# Patient Record
Sex: MALE | Race: WHITE | NOT HISPANIC OR LATINO | URBAN - METROPOLITAN AREA
[De-identification: names, ages, dates, MRNs, and addresses within clinical notes are randomized per-mention and may not be internally consistent; named-entity substitution may affect disease eponyms.]

---

## 2017-11-07 ENCOUNTER — OUTPATIENT (OUTPATIENT)
Dept: OUTPATIENT SERVICES | Facility: HOSPITAL | Age: 47
LOS: 1 days | Discharge: HOME | End: 2017-11-07

## 2017-11-14 DIAGNOSIS — K57.30 DIVERTICULOSIS OF LARGE INTESTINE WITHOUT PERFORATION OR ABSCESS WITHOUT BLEEDING: ICD-10-CM

## 2017-11-14 DIAGNOSIS — K57.32 DIVERTICULITIS OF LARGE INTESTINE WITHOUT PERFORATION OR ABSCESS WITHOUT BLEEDING: ICD-10-CM

## 2019-02-09 ENCOUNTER — TRANSCRIPTION ENCOUNTER (OUTPATIENT)
Age: 49
End: 2019-02-09

## 2020-01-28 ENCOUNTER — OUTPATIENT (OUTPATIENT)
Dept: OUTPATIENT SERVICES | Facility: HOSPITAL | Age: 50
LOS: 1 days | Discharge: HOME | End: 2020-01-28

## 2020-01-29 DIAGNOSIS — G47.33 OBSTRUCTIVE SLEEP APNEA (ADULT) (PEDIATRIC): ICD-10-CM

## 2020-03-06 PROBLEM — Z00.00 ENCOUNTER FOR PREVENTIVE HEALTH EXAMINATION: Status: ACTIVE | Noted: 2020-03-06

## 2020-09-12 ENCOUNTER — TRANSCRIPTION ENCOUNTER (OUTPATIENT)
Age: 50
End: 2020-09-12

## 2020-10-01 ENCOUNTER — APPOINTMENT (OUTPATIENT)
Dept: GASTROENTEROLOGY | Facility: CLINIC | Age: 50
End: 2020-10-01
Payer: COMMERCIAL

## 2020-10-01 DIAGNOSIS — Z80.1 FAMILY HISTORY OF MALIGNANT NEOPLASM OF TRACHEA, BRONCHUS AND LUNG: ICD-10-CM

## 2020-10-01 DIAGNOSIS — Z80.0 FAMILY HISTORY OF MALIGNANT NEOPLASM OF DIGESTIVE ORGANS: ICD-10-CM

## 2020-10-01 DIAGNOSIS — Z78.9 OTHER SPECIFIED HEALTH STATUS: ICD-10-CM

## 2020-10-01 PROCEDURE — 99214 OFFICE O/P EST MOD 30 MIN: CPT | Mod: 95

## 2020-10-01 NOTE — PHYSICAL EXAM
[General Appearance - Alert] : alert [Hearing Threshold Finger Rub Not [place]] : hearing was normal [] : no respiratory distress [Skin Color & Pigmentation] : normal skin color and pigmentation [Oriented To Time, Place, And Person] : oriented to person, place, and time

## 2020-10-01 NOTE — ASSESSMENT
[FreeTextEntry1] : 50 year old male patient average risk for CRC, hx of recurrent diverticulitis, NAFLD/ OROSCO, presents with new episode of diverticulitis, treated at [facility] with cipro and flagyl 10 days, currently resolved. Last colonoscopy 2017 with diverticulosis. \par Otherwise no GI complaints. \par \par Rec: cipro/flagyl \par colonoscopy in 2022

## 2020-10-01 NOTE — HISTORY OF PRESENT ILLNESS
[Home] : at home, [unfilled] , at the time of the visit. [Medical Office: ([facility])___] : at the medical office located in  [Verbal consent obtained from patient] : the patient, [unfilled] [FreeTextEntry4] : Analisa Gar [de-identified] : 50 year old male patient average risk for CRC, hx of recurrent diverticulitis, NAFLD/ OROSCO, presents with new episode of diverticulitis, treated at [facility] with cipro and flagyl 10 days, currently resolved. Last colonoscopy 2017 with diverticulosis. \par Otherwise no GI complaints. \par

## 2021-04-22 ENCOUNTER — TRANSCRIPTION ENCOUNTER (OUTPATIENT)
Age: 51
End: 2021-04-22

## 2021-04-22 ENCOUNTER — APPOINTMENT (OUTPATIENT)
Age: 51
End: 2021-04-22

## 2021-06-10 ENCOUNTER — APPOINTMENT (OUTPATIENT)
Age: 51
End: 2021-06-10
Payer: COMMERCIAL

## 2021-06-10 VITALS
RESPIRATION RATE: 12 BRPM | OXYGEN SATURATION: 97 % | DIASTOLIC BLOOD PRESSURE: 80 MMHG | HEART RATE: 82 BPM | WEIGHT: 223 LBS | SYSTOLIC BLOOD PRESSURE: 120 MMHG

## 2021-06-10 DIAGNOSIS — Z99.89 OBSTRUCTIVE SLEEP APNEA (ADULT) (PEDIATRIC): ICD-10-CM

## 2021-06-10 DIAGNOSIS — G47.33 OBSTRUCTIVE SLEEP APNEA (ADULT) (PEDIATRIC): ICD-10-CM

## 2021-06-10 PROCEDURE — 99212 OFFICE O/P EST SF 10 MIN: CPT

## 2021-06-13 PROBLEM — G47.33 OSA ON CPAP: Status: ACTIVE | Noted: 2021-06-13

## 2021-06-16 ENCOUNTER — INPATIENT (INPATIENT)
Facility: HOSPITAL | Age: 51
LOS: 2 days | Discharge: HOME HEALTH | End: 2021-06-19
Attending: SURGERY | Admitting: SURGERY
Payer: COMMERCIAL

## 2021-06-16 VITALS
HEIGHT: 69 IN | HEART RATE: 105 BPM | TEMPERATURE: 101 F | DIASTOLIC BLOOD PRESSURE: 79 MMHG | SYSTOLIC BLOOD PRESSURE: 126 MMHG | WEIGHT: 218.04 LBS | RESPIRATION RATE: 18 BRPM | OXYGEN SATURATION: 96 %

## 2021-06-16 LAB
ALBUMIN SERPL ELPH-MCNC: 4.1 G/DL — SIGNIFICANT CHANGE UP (ref 3.5–5.2)
ALP SERPL-CCNC: 53 U/L — SIGNIFICANT CHANGE UP (ref 30–115)
ALT FLD-CCNC: 16 U/L — SIGNIFICANT CHANGE UP (ref 0–41)
ANION GAP SERPL CALC-SCNC: 12 MMOL/L — SIGNIFICANT CHANGE UP (ref 7–14)
APPEARANCE UR: CLEAR — SIGNIFICANT CHANGE UP
AST SERPL-CCNC: 22 U/L — SIGNIFICANT CHANGE UP (ref 0–41)
BASOPHILS # BLD AUTO: 0.05 K/UL — SIGNIFICANT CHANGE UP (ref 0–0.2)
BASOPHILS NFR BLD AUTO: 0.3 % — SIGNIFICANT CHANGE UP (ref 0–1)
BILIRUB DIRECT SERPL-MCNC: 0.2 MG/DL — SIGNIFICANT CHANGE UP (ref 0–0.2)
BILIRUB INDIRECT FLD-MCNC: 0.6 MG/DL — SIGNIFICANT CHANGE UP (ref 0.2–1.2)
BILIRUB SERPL-MCNC: 0.8 MG/DL — SIGNIFICANT CHANGE UP (ref 0.2–1.2)
BILIRUB UR-MCNC: NEGATIVE — SIGNIFICANT CHANGE UP
BUN SERPL-MCNC: 13 MG/DL — SIGNIFICANT CHANGE UP (ref 10–20)
CALCIUM SERPL-MCNC: 9.4 MG/DL — SIGNIFICANT CHANGE UP (ref 8.5–10.1)
CHLORIDE SERPL-SCNC: 99 MMOL/L — SIGNIFICANT CHANGE UP (ref 98–110)
CO2 SERPL-SCNC: 21 MMOL/L — SIGNIFICANT CHANGE UP (ref 17–32)
COLOR SPEC: SIGNIFICANT CHANGE UP
CREAT SERPL-MCNC: 1.2 MG/DL — SIGNIFICANT CHANGE UP (ref 0.7–1.5)
DIFF PNL FLD: NEGATIVE — SIGNIFICANT CHANGE UP
EOSINOPHIL # BLD AUTO: 0.02 K/UL — SIGNIFICANT CHANGE UP (ref 0–0.7)
EOSINOPHIL NFR BLD AUTO: 0.1 % — SIGNIFICANT CHANGE UP (ref 0–8)
GLUCOSE SERPL-MCNC: 124 MG/DL — HIGH (ref 70–99)
GLUCOSE UR QL: NEGATIVE — SIGNIFICANT CHANGE UP
HCT VFR BLD CALC: 42.8 % — SIGNIFICANT CHANGE UP (ref 42–52)
HGB BLD-MCNC: 14.9 G/DL — SIGNIFICANT CHANGE UP (ref 14–18)
IMM GRANULOCYTES NFR BLD AUTO: 0.5 % — HIGH (ref 0.1–0.3)
KETONES UR-MCNC: NEGATIVE — SIGNIFICANT CHANGE UP
LACTATE SERPL-SCNC: 1.1 MMOL/L — SIGNIFICANT CHANGE UP (ref 0.7–2)
LEUKOCYTE ESTERASE UR-ACNC: NEGATIVE — SIGNIFICANT CHANGE UP
LIDOCAIN IGE QN: 16 U/L — SIGNIFICANT CHANGE UP (ref 7–60)
LYMPHOCYTES # BLD AUTO: 0.57 K/UL — LOW (ref 1.2–3.4)
LYMPHOCYTES # BLD AUTO: 3.4 % — LOW (ref 20.5–51.1)
MCHC RBC-ENTMCNC: 29.7 PG — SIGNIFICANT CHANGE UP (ref 27–31)
MCHC RBC-ENTMCNC: 34.8 G/DL — SIGNIFICANT CHANGE UP (ref 32–37)
MCV RBC AUTO: 85.4 FL — SIGNIFICANT CHANGE UP (ref 80–94)
MONOCYTES # BLD AUTO: 1.13 K/UL — HIGH (ref 0.1–0.6)
MONOCYTES NFR BLD AUTO: 6.7 % — SIGNIFICANT CHANGE UP (ref 1.7–9.3)
NEUTROPHILS # BLD AUTO: 14.95 K/UL — HIGH (ref 1.4–6.5)
NEUTROPHILS NFR BLD AUTO: 89 % — HIGH (ref 42.2–75.2)
NITRITE UR-MCNC: NEGATIVE — SIGNIFICANT CHANGE UP
NRBC # BLD: 0 /100 WBCS — SIGNIFICANT CHANGE UP (ref 0–0)
PH UR: 7 — SIGNIFICANT CHANGE UP (ref 5–8)
PLATELET # BLD AUTO: 273 K/UL — SIGNIFICANT CHANGE UP (ref 130–400)
POTASSIUM SERPL-MCNC: 4.3 MMOL/L — SIGNIFICANT CHANGE UP (ref 3.5–5)
POTASSIUM SERPL-SCNC: 4.3 MMOL/L — SIGNIFICANT CHANGE UP (ref 3.5–5)
PROT SERPL-MCNC: 6.7 G/DL — SIGNIFICANT CHANGE UP (ref 6–8)
PROT UR-MCNC: NEGATIVE — SIGNIFICANT CHANGE UP
RBC # BLD: 5.01 M/UL — SIGNIFICANT CHANGE UP (ref 4.7–6.1)
RBC # FLD: 13.4 % — SIGNIFICANT CHANGE UP (ref 11.5–14.5)
SARS-COV-2 RNA SPEC QL NAA+PROBE: SIGNIFICANT CHANGE UP
SODIUM SERPL-SCNC: 132 MMOL/L — LOW (ref 135–146)
SP GR SPEC: 1 — LOW (ref 1.01–1.03)
UROBILINOGEN FLD QL: SIGNIFICANT CHANGE UP
WBC # BLD: 16.8 K/UL — HIGH (ref 4.8–10.8)
WBC # FLD AUTO: 16.8 K/UL — HIGH (ref 4.8–10.8)

## 2021-06-16 PROCEDURE — 99285 EMERGENCY DEPT VISIT HI MDM: CPT

## 2021-06-16 PROCEDURE — 99283 EMERGENCY DEPT VISIT LOW MDM: CPT

## 2021-06-16 PROCEDURE — 74177 CT ABD & PELVIS W/CONTRAST: CPT | Mod: 26,MA

## 2021-06-16 RX ORDER — SODIUM CHLORIDE 9 MG/ML
1000 INJECTION, SOLUTION INTRAVENOUS
Refills: 0 | Status: DISCONTINUED | OUTPATIENT
Start: 2021-06-16 | End: 2021-06-17

## 2021-06-16 RX ORDER — ONDANSETRON 8 MG/1
4 TABLET, FILM COATED ORAL ONCE
Refills: 0 | Status: COMPLETED | OUTPATIENT
Start: 2021-06-16 | End: 2021-06-16

## 2021-06-16 RX ORDER — MORPHINE SULFATE 50 MG/1
2 CAPSULE, EXTENDED RELEASE ORAL EVERY 4 HOURS
Refills: 0 | Status: DISCONTINUED | OUTPATIENT
Start: 2021-06-16 | End: 2021-06-16

## 2021-06-16 RX ORDER — SODIUM CHLORIDE 9 MG/ML
1000 INJECTION, SOLUTION INTRAVENOUS ONCE
Refills: 0 | Status: COMPLETED | OUTPATIENT
Start: 2021-06-16 | End: 2021-06-16

## 2021-06-16 RX ORDER — ENOXAPARIN SODIUM 100 MG/ML
40 INJECTION SUBCUTANEOUS DAILY
Refills: 0 | Status: DISCONTINUED | OUTPATIENT
Start: 2021-06-16 | End: 2021-06-17

## 2021-06-16 RX ORDER — ACETAMINOPHEN 500 MG
975 TABLET ORAL ONCE
Refills: 0 | Status: COMPLETED | OUTPATIENT
Start: 2021-06-16 | End: 2021-06-16

## 2021-06-16 RX ORDER — ONDANSETRON 8 MG/1
4 TABLET, FILM COATED ORAL EVERY 6 HOURS
Refills: 0 | Status: DISCONTINUED | OUTPATIENT
Start: 2021-06-16 | End: 2021-06-19

## 2021-06-16 RX ORDER — IBUPROFEN 200 MG
600 TABLET ORAL EVERY 6 HOURS
Refills: 0 | Status: DISCONTINUED | OUTPATIENT
Start: 2021-06-16 | End: 2021-06-18

## 2021-06-16 RX ORDER — PIPERACILLIN AND TAZOBACTAM 4; .5 G/20ML; G/20ML
3.38 INJECTION, POWDER, LYOPHILIZED, FOR SOLUTION INTRAVENOUS ONCE
Refills: 0 | Status: COMPLETED | OUTPATIENT
Start: 2021-06-16 | End: 2021-06-16

## 2021-06-16 RX ORDER — METRONIDAZOLE 500 MG
500 TABLET ORAL EVERY 8 HOURS
Refills: 0 | Status: DISCONTINUED | OUTPATIENT
Start: 2021-06-16 | End: 2021-06-19

## 2021-06-16 RX ORDER — CEFEPIME 1 G/1
2000 INJECTION, POWDER, FOR SOLUTION INTRAMUSCULAR; INTRAVENOUS EVERY 8 HOURS
Refills: 0 | Status: DISCONTINUED | OUTPATIENT
Start: 2021-06-16 | End: 2021-06-19

## 2021-06-16 RX ORDER — OXYCODONE HYDROCHLORIDE 5 MG/1
5 TABLET ORAL EVERY 6 HOURS
Refills: 0 | Status: DISCONTINUED | OUTPATIENT
Start: 2021-06-16 | End: 2021-06-19

## 2021-06-16 RX ORDER — ACETAMINOPHEN 500 MG
650 TABLET ORAL EVERY 6 HOURS
Refills: 0 | Status: DISCONTINUED | OUTPATIENT
Start: 2021-06-16 | End: 2021-06-19

## 2021-06-16 RX ADMIN — ONDANSETRON 4 MILLIGRAM(S): 8 TABLET, FILM COATED ORAL at 16:16

## 2021-06-16 RX ADMIN — SODIUM CHLORIDE 1000 MILLILITER(S): 9 INJECTION, SOLUTION INTRAVENOUS at 16:16

## 2021-06-16 RX ADMIN — SODIUM CHLORIDE 125 MILLILITER(S): 9 INJECTION, SOLUTION INTRAVENOUS at 22:27

## 2021-06-16 RX ADMIN — SODIUM CHLORIDE 1000 MILLILITER(S): 9 INJECTION, SOLUTION INTRAVENOUS at 22:07

## 2021-06-16 RX ADMIN — PIPERACILLIN AND TAZOBACTAM 3.38 GRAM(S): 4; .5 INJECTION, POWDER, LYOPHILIZED, FOR SOLUTION INTRAVENOUS at 22:07

## 2021-06-16 RX ADMIN — Medication 975 MILLIGRAM(S): at 22:07

## 2021-06-16 RX ADMIN — Medication 975 MILLIGRAM(S): at 20:10

## 2021-06-16 RX ADMIN — SODIUM CHLORIDE 1000 MILLILITER(S): 9 INJECTION, SOLUTION INTRAVENOUS at 20:10

## 2021-06-16 RX ADMIN — PIPERACILLIN AND TAZOBACTAM 200 GRAM(S): 4; .5 INJECTION, POWDER, LYOPHILIZED, FOR SOLUTION INTRAVENOUS at 20:25

## 2021-06-16 RX ADMIN — Medication 100 MILLIGRAM(S): at 23:00

## 2021-06-16 RX ADMIN — CEFEPIME 100 MILLIGRAM(S): 1 INJECTION, POWDER, FOR SOLUTION INTRAMUSCULAR; INTRAVENOUS at 22:26

## 2021-06-16 NOTE — ED PROVIDER NOTE - ATTENDING CONTRIBUTION TO CARE
49 y/o male with h/o multiple prior episodes of diverticulitis, in ER with c/o LLQ pain.  Pt states pain started ~ 11 days ago, c/w his prior episodes of diverticulitis, he called his GI doctor, Dr. Kim, and was started on cipro/flagyl which he's been taking 11 days.  Pt states the LLQ pain has improved, but today developed chills and worsening N and V.  + mucoid loose stool.  no dysuria/hematuria.  no cp/sob.  no ha/dizziness/loc.  no h/o abd surgeries in past.  PE - nad, nc/at, eomi, perrl, op - clear, cta b/l, no w/r/r, rrr, abd-soft, + LLQ tenderness, no guarding/rebound, nabs, from x 4, A&O x 3, no focal neuro deficits.  -ivf, check labs, ct abd.

## 2021-06-16 NOTE — H&P ADULT - NSHPLABSRESULTS_GEN_ALL_CORE
LAB/STUDIES:                        14.9   16.80 )-----------( 273      ( 2021 16:13 )             42.8     06-16    132<L>  |  99  |  13  ----------------------------<  124<H>  4.3   |  21  |  1.2    Ca    9.4      2021 16:13    TPro  6.7  /  Alb  4.1  /  TBili  0.8  /  DBili  0.2  /  AST  22  /  ALT  16  /  AlkPhos  53  06-16      LIVER FUNCTIONS - ( 2021 16:13 )  Alb: 4.1 g/dL / Pro: 6.7 g/dL / ALK PHOS: 53 U/L / ALT: 16 U/L / AST: 22 U/L / GGT: x           Urinalysis Basic - ( 2021 18:02 )    Color: Light Yellow / Appearance: Clear / S.005 / pH: x  Gluc: x / Ketone: Negative  / Bili: Negative / Urobili: <2 mg/dL   Blood: x / Protein: Negative / Nitrite: Negative   Leuk Esterase: Negative / RBC: x / WBC x   Sq Epi: x / Non Sq Epi: x / Bacteria: x      IMAGING:        ACCESS DEVICES:  [x] Peripheral IV  [ ] Central Venous Line	[ ] R	[ ] L	[ ] IJ	[ ] Fem	[ ] SC	Placed:   [ ] Arterial Line		[ ] R	[ ] L	[ ] Fem	[ ] Rad	[ ] Ax	Placed:   [ ] PICC:					[ ] Mediport  [ ] Urinary Catheter, Date Placed: qtc 464

## 2021-06-16 NOTE — H&P ADULT - ASSESSMENT
ASSESSMENT:  50M PMH/PSH only pertinent for diverticulitis, countless flares, never seen a surgeon, follows w/ Dr. Kim from GI, and PCP Otto, currently on day 10/14 outpt cipro/flagyl PO course for ongoing current flare. Last c-scope 2017, showed severe sigmoid diverticulosis, suboptimal prep, but no other gross abnormalities. Pt presents to ED today w/ 1 day of worsening of ongoing sx. Pain sharp, located in the LLQ and suprapubic area. No exacerbating/relieving fx. Pt denies CP, SOB, n/v/, urinary symptoms. Physical exam findings, imaging, and labs as documented above.     PLAN:  -NPO, IVF, Abx escalate to cefepime/flagyl  -Admit to surg  -Will likely need outpt colectomy due to ongoing flares, sx    Lines/Tubes: PIV    Above plan discussed with Attending Surgeon Dr. Espino, patient, patient family, and Primary team  06-16-21 @ 20:58

## 2021-06-16 NOTE — H&P ADULT - NSHPPHYSICALEXAM_GEN_ALL_CORE
VITALS:  T(F): 100.9 (06-16-21 @ 15:20), Max: 100.9 (06-16-21 @ 15:20)  HR: 105 (06-16-21 @ 15:20) (105 - 105)  BP: 126/79 (06-16-21 @ 15:20) (126/79 - 126/79)  RR: 18 (06-16-21 @ 15:20) (18 - 18)  SpO2: 96% (06-16-21 @ 15:20) (96% - 96%)    PHYSICAL EXAM:  General: NAD, AAOx3, calm and cooperative  HEENT: NCAT, ISIDRO, EOMI, Trachea ML, Neck supple  Cardiac: RRR S1, S2, no Murmurs, rubs or gallops  Respiratory: CTAB, normal respiratory effort, breath sounds equal BL, no wheeze, rhonchi or crackles  Abdomen: Soft, non-distended, non-tender, no rebound, no guarding. +BS.  Rectal: Good tone, +stool, no blood, no theodore-anal masses/lesions, no fistulas, fissures, hemorrhoids  Musculoskeletal: Strength 5/5 BL UE/LE, ROM intact, compartments soft  Neuro: Sensation grossly intact and equal throughout, no focal deficits  Vascular: Pulses 2+ throughout, extremities well perfused  Skin: Warm/dry, normal color, no jaundice  Incision/wound: healing well, dressings in place, clean, dry and intact

## 2021-06-16 NOTE — ED ADULT NURSE REASSESSMENT NOTE - NS ED NURSE REASSESS COMMENT FT1
Assumed care from previous  nurse c/o abdominal pain , nausea , vomiting , HX diverticulitis , pt AO x 4 , no vomiting noted this tome , no SOB ,denies nausea this time , denies abdominal pain this time , ambulatory , denies chest pain , denies headache , denies dizziness , , nursing rounds done

## 2021-06-16 NOTE — ED PROVIDER NOTE - NS ED ROS FT
Constitutional: no fever, chills, no recent weight loss, change in appetite or malaise  Eyes: no redness/discharge/pain/vision changes  ENT: no rhinorrhea/ear pain/sore throat  Cardiac: No chest pain, SOB or edema.  Respiratory: No cough or respiratory distress  : No dysuria, frequency, urgency or hematuria  MS: no pain to back or extremities, no loss of ROM, no weakness  Neuro: No headache or weakness. No LOC.  Skin: No skin rash.  Endocrine: No history of thyroid disease or diabetes.  Except as documented in the HPI, all other systems are negative.

## 2021-06-16 NOTE — H&P ADULT - HISTORY OF PRESENT ILLNESS
GENERAL SURGERY CONSULT NOTE    Patient: LUIS EDUARDO LUIS , 50y ([birth year])Male   MRN: [id number]  Location: [facility] ED  Visit: 06-16-21 Emergency  Date: 06-16-21 @ 20:58    HPI:  50M PMH/PSH only pertinent for diverticulitis, countless flares, never seen a surgeon, follows w/ Dr. Kim from GI, and PCP Otto, currently on day 10/14 outpt cipro/flagyl PO course for ongoing current flare. Last c-scope 2017, showed severe sigmoid diverticulosis, suboptimal prep, but no other gross abnormalities. Pt presents to ED today w/ 1 day of worsening of ongoing sx. Pain sharp, located in the LLQ and suprapubic area. No exacerbating/relieving fx. Pt denies CP, SOB, n/v/, urinary symptoms.    PAST MEDICAL & SURGICAL HISTORY:      Home Medications:      MEDICATIONS  (STANDING):    MEDICATIONS  (PRN):

## 2021-06-16 NOTE — H&P ADULT - ATTENDING COMMENTS
49yo male with PMHx of diverticulitis presenting with acute onset of bilateral lower abdominal pain, nausea/vomiting, and subjective fever/chills. Last colonoscopy 2017 with Dr. Kim. Was prescribed Cipro/Flagyl by GI. Denies fever/chills, chest pain or shortness of breath. On exam, patient is tender in bilateral lower quadrants without peritonitis. Labs reviewed - WBC 16.8, LFT's WNL. Admit, pain control, NPO, IV fuilds, madhu control, antibiotics, serial abdominal exams, dispo pending.

## 2021-06-16 NOTE — ED PROVIDER NOTE - OBJECTIVE STATEMENT
pt with h/o diverticulitis presents to ED for eval of 10 days of abd pain, n/v/d c/w prior hx of diverticulitis but no relief from cipro/flagyl taken since onset of sxs. pain is sharp, nonradiating, moderate. denies exacerbating or relieving factors. Denies fever/chill/HA/dizziness/chest pain/palpitation/sob/black stool/bloody stool/urinary sxs

## 2021-06-16 NOTE — ED PROVIDER NOTE - PROGRESS NOTE DETAILS
d/w  of gen surg, will see pt in ED wbc 16K, lactate neg.  CT abd: + acute sigmoid diverticulitis with 2.8 cm intramural abscess, no pneumoperitoneum.  Pt given IV abx, case d/w surgery, to come and eval. Pt seen and eval by surgery, to be admitted to surgical service for continued care.

## 2021-06-17 LAB
ANION GAP SERPL CALC-SCNC: 10 MMOL/L — SIGNIFICANT CHANGE UP (ref 7–14)
ANION GAP SERPL CALC-SCNC: 10 MMOL/L — SIGNIFICANT CHANGE UP (ref 7–14)
BASOPHILS # BLD AUTO: 0.03 K/UL — SIGNIFICANT CHANGE UP (ref 0–0.2)
BASOPHILS # BLD AUTO: 0.06 K/UL — SIGNIFICANT CHANGE UP (ref 0–0.2)
BASOPHILS NFR BLD AUTO: 0.5 % — SIGNIFICANT CHANGE UP (ref 0–1)
BASOPHILS NFR BLD AUTO: 0.8 % — SIGNIFICANT CHANGE UP (ref 0–1)
BUN SERPL-MCNC: 12 MG/DL — SIGNIFICANT CHANGE UP (ref 10–20)
BUN SERPL-MCNC: 9 MG/DL — LOW (ref 10–20)
CALCIUM SERPL-MCNC: 8.3 MG/DL — LOW (ref 8.5–10.1)
CALCIUM SERPL-MCNC: 9 MG/DL — SIGNIFICANT CHANGE UP (ref 8.5–10.1)
CHLORIDE SERPL-SCNC: 101 MMOL/L — SIGNIFICANT CHANGE UP (ref 98–110)
CHLORIDE SERPL-SCNC: 103 MMOL/L — SIGNIFICANT CHANGE UP (ref 98–110)
CO2 SERPL-SCNC: 25 MMOL/L — SIGNIFICANT CHANGE UP (ref 17–32)
CO2 SERPL-SCNC: 26 MMOL/L — SIGNIFICANT CHANGE UP (ref 17–32)
COVID-19 SPIKE DOMAIN AB INTERP: POSITIVE
COVID-19 SPIKE DOMAIN ANTIBODY RESULT: >250 U/ML — HIGH
CREAT SERPL-MCNC: 1.2 MG/DL — SIGNIFICANT CHANGE UP (ref 0.7–1.5)
CREAT SERPL-MCNC: 1.3 MG/DL — SIGNIFICANT CHANGE UP (ref 0.7–1.5)
CULTURE RESULTS: NO GROWTH — SIGNIFICANT CHANGE UP
EOSINOPHIL # BLD AUTO: 0.05 K/UL — SIGNIFICANT CHANGE UP (ref 0–0.7)
EOSINOPHIL # BLD AUTO: 0.08 K/UL — SIGNIFICANT CHANGE UP (ref 0–0.7)
EOSINOPHIL NFR BLD AUTO: 0.7 % — SIGNIFICANT CHANGE UP (ref 0–8)
EOSINOPHIL NFR BLD AUTO: 1.3 % — SIGNIFICANT CHANGE UP (ref 0–8)
GLUCOSE SERPL-MCNC: 172 MG/DL — HIGH (ref 70–99)
GLUCOSE SERPL-MCNC: 99 MG/DL — SIGNIFICANT CHANGE UP (ref 70–99)
HCT VFR BLD CALC: 40.2 % — LOW (ref 42–52)
HCT VFR BLD CALC: 40.9 % — LOW (ref 42–52)
HGB BLD-MCNC: 13.4 G/DL — LOW (ref 14–18)
HGB BLD-MCNC: 13.6 G/DL — LOW (ref 14–18)
IMM GRANULOCYTES NFR BLD AUTO: 0.2 % — SIGNIFICANT CHANGE UP (ref 0.1–0.3)
IMM GRANULOCYTES NFR BLD AUTO: 0.5 % — HIGH (ref 0.1–0.3)
LYMPHOCYTES # BLD AUTO: 0.62 K/UL — LOW (ref 1.2–3.4)
LYMPHOCYTES # BLD AUTO: 0.77 K/UL — LOW (ref 1.2–3.4)
LYMPHOCYTES # BLD AUTO: 12.6 % — LOW (ref 20.5–51.1)
LYMPHOCYTES # BLD AUTO: 8.2 % — LOW (ref 20.5–51.1)
MAGNESIUM SERPL-MCNC: 1.8 MG/DL — SIGNIFICANT CHANGE UP (ref 1.8–2.4)
MAGNESIUM SERPL-MCNC: 1.8 MG/DL — SIGNIFICANT CHANGE UP (ref 1.8–2.4)
MCHC RBC-ENTMCNC: 29.5 PG — SIGNIFICANT CHANGE UP (ref 27–31)
MCHC RBC-ENTMCNC: 29.6 PG — SIGNIFICANT CHANGE UP (ref 27–31)
MCHC RBC-ENTMCNC: 33.3 G/DL — SIGNIFICANT CHANGE UP (ref 32–37)
MCHC RBC-ENTMCNC: 33.3 G/DL — SIGNIFICANT CHANGE UP (ref 32–37)
MCV RBC AUTO: 88.5 FL — SIGNIFICANT CHANGE UP (ref 80–94)
MCV RBC AUTO: 88.9 FL — SIGNIFICANT CHANGE UP (ref 80–94)
MONOCYTES # BLD AUTO: 0.35 K/UL — SIGNIFICANT CHANGE UP (ref 0.1–0.6)
MONOCYTES # BLD AUTO: 0.57 K/UL — SIGNIFICANT CHANGE UP (ref 0.1–0.6)
MONOCYTES NFR BLD AUTO: 5.7 % — SIGNIFICANT CHANGE UP (ref 1.7–9.3)
MONOCYTES NFR BLD AUTO: 7.6 % — SIGNIFICANT CHANGE UP (ref 1.7–9.3)
NEUTROPHILS # BLD AUTO: 4.88 K/UL — SIGNIFICANT CHANGE UP (ref 1.4–6.5)
NEUTROPHILS # BLD AUTO: 6.18 K/UL — SIGNIFICANT CHANGE UP (ref 1.4–6.5)
NEUTROPHILS NFR BLD AUTO: 79.7 % — HIGH (ref 42.2–75.2)
NEUTROPHILS NFR BLD AUTO: 82.2 % — HIGH (ref 42.2–75.2)
NRBC # BLD: 0 /100 WBCS — SIGNIFICANT CHANGE UP (ref 0–0)
NRBC # BLD: 0 /100 WBCS — SIGNIFICANT CHANGE UP (ref 0–0)
PHOSPHATE SERPL-MCNC: 3 MG/DL — SIGNIFICANT CHANGE UP (ref 2.1–4.9)
PHOSPHATE SERPL-MCNC: 3.7 MG/DL — SIGNIFICANT CHANGE UP (ref 2.1–4.9)
PLATELET # BLD AUTO: 244 K/UL — SIGNIFICANT CHANGE UP (ref 130–400)
PLATELET # BLD AUTO: 244 K/UL — SIGNIFICANT CHANGE UP (ref 130–400)
POTASSIUM SERPL-MCNC: 3.9 MMOL/L — SIGNIFICANT CHANGE UP (ref 3.5–5)
POTASSIUM SERPL-MCNC: 4.4 MMOL/L — SIGNIFICANT CHANGE UP (ref 3.5–5)
POTASSIUM SERPL-SCNC: 3.9 MMOL/L — SIGNIFICANT CHANGE UP (ref 3.5–5)
POTASSIUM SERPL-SCNC: 4.4 MMOL/L — SIGNIFICANT CHANGE UP (ref 3.5–5)
RBC # BLD: 4.54 M/UL — LOW (ref 4.7–6.1)
RBC # BLD: 4.6 M/UL — LOW (ref 4.7–6.1)
RBC # FLD: 13.6 % — SIGNIFICANT CHANGE UP (ref 11.5–14.5)
RBC # FLD: 13.9 % — SIGNIFICANT CHANGE UP (ref 11.5–14.5)
SARS-COV-2 IGG+IGM SERPL QL IA: >250 U/ML — HIGH
SARS-COV-2 IGG+IGM SERPL QL IA: POSITIVE
SODIUM SERPL-SCNC: 136 MMOL/L — SIGNIFICANT CHANGE UP (ref 135–146)
SODIUM SERPL-SCNC: 139 MMOL/L — SIGNIFICANT CHANGE UP (ref 135–146)
SPECIMEN SOURCE: SIGNIFICANT CHANGE UP
WBC # BLD: 6.12 K/UL — SIGNIFICANT CHANGE UP (ref 4.8–10.8)
WBC # BLD: 7.52 K/UL — SIGNIFICANT CHANGE UP (ref 4.8–10.8)
WBC # FLD AUTO: 6.12 K/UL — SIGNIFICANT CHANGE UP (ref 4.8–10.8)
WBC # FLD AUTO: 7.52 K/UL — SIGNIFICANT CHANGE UP (ref 4.8–10.8)

## 2021-06-17 PROCEDURE — 99232 SBSQ HOSP IP/OBS MODERATE 35: CPT

## 2021-06-17 RX ORDER — PANTOPRAZOLE SODIUM 20 MG/1
40 TABLET, DELAYED RELEASE ORAL
Refills: 0 | Status: DISCONTINUED | OUTPATIENT
Start: 2021-06-17 | End: 2021-06-19

## 2021-06-17 RX ORDER — SODIUM CHLORIDE 9 MG/ML
1000 INJECTION, SOLUTION INTRAVENOUS
Refills: 0 | Status: DISCONTINUED | OUTPATIENT
Start: 2021-06-17 | End: 2021-06-18

## 2021-06-17 RX ORDER — HEPARIN SODIUM 5000 [USP'U]/ML
5000 INJECTION INTRAVENOUS; SUBCUTANEOUS EVERY 8 HOURS
Refills: 0 | Status: DISCONTINUED | OUTPATIENT
Start: 2021-06-17 | End: 2021-06-19

## 2021-06-17 RX ADMIN — Medication 100 MILLIGRAM(S): at 06:40

## 2021-06-17 RX ADMIN — Medication 100 MILLIGRAM(S): at 13:24

## 2021-06-17 RX ADMIN — Medication 600 MILLIGRAM(S): at 05:06

## 2021-06-17 RX ADMIN — CEFEPIME 100 MILLIGRAM(S): 1 INJECTION, POWDER, FOR SOLUTION INTRAMUSCULAR; INTRAVENOUS at 21:11

## 2021-06-17 RX ADMIN — HEPARIN SODIUM 5000 UNIT(S): 5000 INJECTION INTRAVENOUS; SUBCUTANEOUS at 21:11

## 2021-06-17 RX ADMIN — Medication 650 MILLIGRAM(S): at 01:01

## 2021-06-17 RX ADMIN — Medication 650 MILLIGRAM(S): at 17:43

## 2021-06-17 RX ADMIN — ONDANSETRON 4 MILLIGRAM(S): 8 TABLET, FILM COATED ORAL at 14:52

## 2021-06-17 RX ADMIN — Medication 600 MILLIGRAM(S): at 17:43

## 2021-06-17 RX ADMIN — CEFEPIME 100 MILLIGRAM(S): 1 INJECTION, POWDER, FOR SOLUTION INTRAMUSCULAR; INTRAVENOUS at 05:04

## 2021-06-17 RX ADMIN — CEFEPIME 100 MILLIGRAM(S): 1 INJECTION, POWDER, FOR SOLUTION INTRAMUSCULAR; INTRAVENOUS at 13:24

## 2021-06-17 RX ADMIN — Medication 600 MILLIGRAM(S): at 23:31

## 2021-06-17 RX ADMIN — Medication 650 MILLIGRAM(S): at 00:30

## 2021-06-17 RX ADMIN — Medication 650 MILLIGRAM(S): at 23:31

## 2021-06-17 RX ADMIN — Medication 650 MILLIGRAM(S): at 05:04

## 2021-06-17 RX ADMIN — ENOXAPARIN SODIUM 40 MILLIGRAM(S): 100 INJECTION SUBCUTANEOUS at 11:45

## 2021-06-17 RX ADMIN — Medication 100 MILLIGRAM(S): at 21:11

## 2021-06-17 RX ADMIN — Medication 600 MILLIGRAM(S): at 00:30

## 2021-06-17 RX ADMIN — PANTOPRAZOLE SODIUM 40 MILLIGRAM(S): 20 TABLET, DELAYED RELEASE ORAL at 07:47

## 2021-06-17 RX ADMIN — Medication 600 MILLIGRAM(S): at 01:01

## 2021-06-17 RX ADMIN — SODIUM CHLORIDE 150 MILLILITER(S): 9 INJECTION, SOLUTION INTRAVENOUS at 13:20

## 2021-06-17 NOTE — PROGRESS NOTE ADULT - SUBJECTIVE AND OBJECTIVE BOX
GENERAL SURGERY PROGRESS NOTE    Patient: LUIS EDUARDO LUIS , 50y (70)Male   MRN: [id number]  Location: [address]  Visit: 21 Inpatient  Date: 21 @ 04:51    Hospital Day #: 2    Events of past 24 hours: No acute events    PAST MEDICAL & SURGICAL HISTORY:  History of diverticulitis        Vitals:   T(F): 99.9 (21 @ 01:17), Max: 100.9 (21 @ 15:20)  HR: 77 (21 @ 01:17)  BP: 141/81 (21 @ 01:17)  RR: 18 (21 @ 01:17)  SpO2: 99% (21 @ 23:30)      Diet, NPO:   Except Medications      Fluids: lactated ringers.: Solution, 1000 milliLiter(s) infuse at 125 mL/Hr      I & O's:  PHYSICAL EXAM:  General: NAD, AAOx3, calm and cooperative  HEENT: NCAT, ISIDRO, EOMI, Trachea ML, Neck supple  Cardiac: RRR S1, S2, no Murmurs, rubs or gallops  Respiratory: CTAB, normal respiratory effort, breath sounds equal BL, no wheeze, rhonchi or crackles  Abdomen: Soft, non-distended, non-tender, no rebound, no guarding. +BS.  Musculoskeletal: Strength 5/5 BL UE/LE, ROM intact, compartments soft  Incision/wound: healing well, dressings in place, clean, dry and intact    MEDICATIONS  (STANDING):  acetaminophen   Tablet .. 650 milliGRAM(s) Oral every 6 hours  cefepime   IVPB 2000 milliGRAM(s) IV Intermittent every 8 hours  enoxaparin Injectable 40 milliGRAM(s) SubCutaneous daily  ibuprofen  Tablet. 600 milliGRAM(s) Oral every 6 hours  lactated ringers. 1000 milliLiter(s) (125 mL/Hr) IV Continuous <Continuous>  metroNIDAZOLE  IVPB 500 milliGRAM(s) IV Intermittent every 8 hours  pantoprazole    Tablet 40 milliGRAM(s) Oral before breakfast    MEDICATIONS  (PRN):  ondansetron Injectable 4 milliGRAM(s) IV Push every 6 hours PRN Nausea  oxyCODONE    IR 5 milliGRAM(s) Oral every 6 hours PRN Moderate Pain (4 - 6)      DVT PROPHYLAXIS: enoxaparin Injectable 40 milliGRAM(s) SubCutaneous daily    GI PROPHYLAXIS: pantoprazole    Tablet 40 milliGRAM(s) Oral before breakfast    ANTICOAGULATION:   ANTIBIOTICS:  cefepime   IVPB 2000 milliGRAM(s)  metroNIDAZOLE  IVPB 500 milliGRAM(s)            LAB/STUDIES:  Labs:  CAPILLARY BLOOD GLUCOSE                              14.9   16.80 )-----------( 273      ( 2021 16:13 )             42.8       Auto Neutrophil %: 89.0 % (21 @ 16:13)  Auto Immature Granulocyte %: 0.5 % (21 @ 16:13)        132<L>  |  99  |  13  ----------------------------<  124<H>  4.3   |  21  |  1.2      Calcium, Total Serum: 9.4 mg/dL (21 @ 16:13)      LFTs:             6.7  | 0.8  | 22       ------------------[53      ( 2021 16:13 )  4.1  | 0.2  | 16          Lipase:16     Amylase:x         Lactate, Blood: 1.1 mmol/L (21 @ 16:13)      Coags:            Urinalysis Basic - ( 2021 18:02 )    Color: Light Yellow / Appearance: Clear / S.005 / pH: x  Gluc: x / Ketone: Negative  / Bili: Negative / Urobili: <2 mg/dL   Blood: x / Protein: Negative / Nitrite: Negative   Leuk Esterase: Negative / RBC: x / WBC x   Sq Epi: x / Non Sq Epi: x / Bacteria: x                IMAGING:

## 2021-06-17 NOTE — PROGRESS NOTE ADULT - ASSESSMENT
50M PMH/PSH only pertinent for diverticulitis, countless flares, never seen a surgeon, follows w/ Dr. Kim from GI, and PCP Otto, currently on day 10/14 outpt cipro/flagyl PO course for ongoing current flare. Last c-scope 2017, showed severe sigmoid diverticulosis, suboptimal prep, but no other gross abnormalities. Pt presents to ED today w/ 1 day of worsening of ongoing sx. Pain sharp, located in the LLQ and suprapubic area. No exacerbating/relieving fx. Pt denies CP, SOB, n/v/, urinary symptoms. Physical exam findings, imaging, and labs as documented above.     PLAN:  -NPO, IVF, Abx escalate to cefepime/flagyl  -Admit to surg  -Will likely need outpt colectomy due to ongoing flares, sx

## 2021-06-18 LAB
ANION GAP SERPL CALC-SCNC: 11 MMOL/L — SIGNIFICANT CHANGE UP (ref 7–14)
BASOPHILS # BLD AUTO: 0.05 K/UL — SIGNIFICANT CHANGE UP (ref 0–0.2)
BASOPHILS NFR BLD AUTO: 1 % — SIGNIFICANT CHANGE UP (ref 0–1)
BUN SERPL-MCNC: 9 MG/DL — LOW (ref 10–20)
CALCIUM SERPL-MCNC: 8.8 MG/DL — SIGNIFICANT CHANGE UP (ref 8.5–10.1)
CHLORIDE SERPL-SCNC: 102 MMOL/L — SIGNIFICANT CHANGE UP (ref 98–110)
CO2 SERPL-SCNC: 24 MMOL/L — SIGNIFICANT CHANGE UP (ref 17–32)
CREAT SERPL-MCNC: 1.2 MG/DL — SIGNIFICANT CHANGE UP (ref 0.7–1.5)
EOSINOPHIL # BLD AUTO: 0.19 K/UL — SIGNIFICANT CHANGE UP (ref 0–0.7)
EOSINOPHIL NFR BLD AUTO: 3.6 % — SIGNIFICANT CHANGE UP (ref 0–8)
GLUCOSE SERPL-MCNC: 146 MG/DL — HIGH (ref 70–99)
HCT VFR BLD CALC: 41.2 % — LOW (ref 42–52)
HGB BLD-MCNC: 13.8 G/DL — LOW (ref 14–18)
IMM GRANULOCYTES NFR BLD AUTO: 0.4 % — HIGH (ref 0.1–0.3)
LYMPHOCYTES # BLD AUTO: 1.05 K/UL — LOW (ref 1.2–3.4)
LYMPHOCYTES # BLD AUTO: 20.1 % — LOW (ref 20.5–51.1)
MAGNESIUM SERPL-MCNC: 2.2 MG/DL — SIGNIFICANT CHANGE UP (ref 1.8–2.4)
MCHC RBC-ENTMCNC: 29.5 PG — SIGNIFICANT CHANGE UP (ref 27–31)
MCHC RBC-ENTMCNC: 33.5 G/DL — SIGNIFICANT CHANGE UP (ref 32–37)
MCV RBC AUTO: 88 FL — SIGNIFICANT CHANGE UP (ref 80–94)
MONOCYTES # BLD AUTO: 0.53 K/UL — SIGNIFICANT CHANGE UP (ref 0.1–0.6)
MONOCYTES NFR BLD AUTO: 10.2 % — HIGH (ref 1.7–9.3)
NEUTROPHILS # BLD AUTO: 3.38 K/UL — SIGNIFICANT CHANGE UP (ref 1.4–6.5)
NEUTROPHILS NFR BLD AUTO: 64.7 % — SIGNIFICANT CHANGE UP (ref 42.2–75.2)
NRBC # BLD: 0 /100 WBCS — SIGNIFICANT CHANGE UP (ref 0–0)
PHOSPHATE SERPL-MCNC: 2.6 MG/DL — SIGNIFICANT CHANGE UP (ref 2.1–4.9)
PLATELET # BLD AUTO: 231 K/UL — SIGNIFICANT CHANGE UP (ref 130–400)
POTASSIUM SERPL-MCNC: 4 MMOL/L — SIGNIFICANT CHANGE UP (ref 3.5–5)
POTASSIUM SERPL-SCNC: 4 MMOL/L — SIGNIFICANT CHANGE UP (ref 3.5–5)
RBC # BLD: 4.68 M/UL — LOW (ref 4.7–6.1)
RBC # FLD: 13.9 % — SIGNIFICANT CHANGE UP (ref 11.5–14.5)
SODIUM SERPL-SCNC: 137 MMOL/L — SIGNIFICANT CHANGE UP (ref 135–146)
WBC # BLD: 5.22 K/UL — SIGNIFICANT CHANGE UP (ref 4.8–10.8)
WBC # FLD AUTO: 5.22 K/UL — SIGNIFICANT CHANGE UP (ref 4.8–10.8)

## 2021-06-18 RX ORDER — MAGNESIUM SULFATE 500 MG/ML
1 VIAL (ML) INJECTION ONCE
Refills: 0 | Status: COMPLETED | OUTPATIENT
Start: 2021-06-18 | End: 2021-06-18

## 2021-06-18 RX ADMIN — Medication 650 MILLIGRAM(S): at 23:32

## 2021-06-18 RX ADMIN — SODIUM CHLORIDE 150 MILLILITER(S): 9 INJECTION, SOLUTION INTRAVENOUS at 04:47

## 2021-06-18 RX ADMIN — PANTOPRAZOLE SODIUM 40 MILLIGRAM(S): 20 TABLET, DELAYED RELEASE ORAL at 06:20

## 2021-06-18 RX ADMIN — CEFEPIME 100 MILLIGRAM(S): 1 INJECTION, POWDER, FOR SOLUTION INTRAMUSCULAR; INTRAVENOUS at 21:20

## 2021-06-18 RX ADMIN — Medication 650 MILLIGRAM(S): at 11:01

## 2021-06-18 RX ADMIN — Medication 100 MILLIGRAM(S): at 13:49

## 2021-06-18 RX ADMIN — CEFEPIME 100 MILLIGRAM(S): 1 INJECTION, POWDER, FOR SOLUTION INTRAMUSCULAR; INTRAVENOUS at 13:49

## 2021-06-18 RX ADMIN — HEPARIN SODIUM 5000 UNIT(S): 5000 INJECTION INTRAVENOUS; SUBCUTANEOUS at 13:49

## 2021-06-18 RX ADMIN — HEPARIN SODIUM 5000 UNIT(S): 5000 INJECTION INTRAVENOUS; SUBCUTANEOUS at 05:07

## 2021-06-18 RX ADMIN — CEFEPIME 100 MILLIGRAM(S): 1 INJECTION, POWDER, FOR SOLUTION INTRAMUSCULAR; INTRAVENOUS at 05:07

## 2021-06-18 RX ADMIN — Medication 600 MILLIGRAM(S): at 05:06

## 2021-06-18 RX ADMIN — Medication 100 MILLIGRAM(S): at 21:20

## 2021-06-18 RX ADMIN — HEPARIN SODIUM 5000 UNIT(S): 5000 INJECTION INTRAVENOUS; SUBCUTANEOUS at 21:20

## 2021-06-18 RX ADMIN — Medication 100 MILLIGRAM(S): at 05:07

## 2021-06-18 RX ADMIN — Medication 100 GRAM(S): at 01:55

## 2021-06-18 RX ADMIN — Medication 650 MILLIGRAM(S): at 05:06

## 2021-06-18 NOTE — PROGRESS NOTE ADULT - SUBJECTIVE AND OBJECTIVE BOX
GENERAL SURGERY PROGRESS NOTE    Patient: LUIS EDUARDO LUIS , 50y (70)Male   MRN: [id number]  Location: [address]  Visit: 21 Inpatient  Date: 21 @ 10:25    Hospital Day #: 3  Post-Op Day #:    Procedure/Dx/Injuries:    Events of past 24 hours: Patient had temperature of 100.9 F last night. No other acute events. Tolerated CLD.     PAST MEDICAL & SURGICAL HISTORY:  History of diverticulitis      Vitals:   T(F): 97.8 (21 @ 09:35), Max: 100.9 (21 @ 17:00)  HR: 65 (21 @ 09:35)  BP: 99/60 (21 @ 09:35)  RR: 18 (21 @ 09:35)  SpO2: --      Diet, Low Fiber:   Supplement Feeding Modality:  Oral  Ensure Enlive Cans or Servings Per Day:  1       Frequency:  Three Times a day      Fluids:     I & O's:    21 @ 07:01  -  21 @ 07:00  --------------------------------------------------------  IN:    dextrose 5% + sodium chloride 0.45%: 825 mL    Lactated Ringers: 687 mL  Total IN: 1512 mL    OUT:    Voided (mL): 2925 mL  Total OUT: 2925 mL    Total NET: -1413 mL        Bowel Movement: : [] YES [x] NO  Flatus: : [x] YES [] NO    PHYSICAL EXAM:  General: NAD, AAOx3, calm and cooperative  Abdomen: Soft, non-distended, non-tender, no rebound, no guarding. +BS.      MEDICATIONS  (STANDING):  acetaminophen   Tablet .. 650 milliGRAM(s) Oral every 6 hours  cefepime   IVPB 2000 milliGRAM(s) IV Intermittent every 8 hours  heparin   Injectable 5000 Unit(s) SubCutaneous every 8 hours  metroNIDAZOLE  IVPB 500 milliGRAM(s) IV Intermittent every 8 hours  pantoprazole    Tablet 40 milliGRAM(s) Oral before breakfast    MEDICATIONS  (PRN):  ondansetron Injectable 4 milliGRAM(s) IV Push every 6 hours PRN Nausea  oxyCODONE    IR 5 milliGRAM(s) Oral every 6 hours PRN Moderate Pain (4 - 6)      DVT PROPHYLAXIS: heparin   Injectable 5000 Unit(s) SubCutaneous every 8 hours    GI PROPHYLAXIS: pantoprazole    Tablet 40 milliGRAM(s) Oral before breakfast    ANTICOAGULATION:   ANTIBIOTICS:  cefepime   IVPB 2000 milliGRAM(s)  metroNIDAZOLE  IVPB 500 milliGRAM(s)      LAB/STUDIES:                       13.4   6.12  )-----------( 244      ( 2021 20:34 )             40.2       Auto Neutrophil %: 79.7 % (21 @ 20:34)  Auto Immature Granulocyte %: 0.2 % (21 @ 20:34)        136  |  101  |  9<L>  ----------------------------<  172<H>  3.9   |  25  |  1.3      Calcium, Total Serum: 8.3 mg/dL (21 @ 20:34)      LFTs:             6.7  | 0.8  | 22       ------------------[53      ( 2021 16:13 )  4.1  | 0.2  | 16          Lipase:16     Amylase:x         Lactate, Blood: 1.1 mmol/L (21 @ 16:13)      Coags:      Urinalysis Basic - ( 2021 18:02 )    Color: Light Yellow / Appearance: Clear / S.005 / pH: x  Gluc: x / Ketone: Negative  / Bili: Negative / Urobili: <2 mg/dL   Blood: x / Protein: Negative / Nitrite: Negative   Leuk Esterase: Negative / RBC: x / WBC x   Sq Epi: x / Non Sq Epi: x / Bacteria: x        Culture - Urine (collected 2021 18:02)  Source: .Urine Clean Catch (Midstream)  Final Report (2021 20:41):    No growth      IMAGING:  N/A    ACCESS/ DEVICES:  [ x] Peripheral IV  [ ] Central Venous Line	[ ] R	[ ] L	[ ] IJ	[ ] Fem	[ ] SC	Placed:   [ ] Arterial Line		[ ] R	[ ] L	[ ] Fem	[ ] Rad	[ ] Ax	Placed:   [ ] PICC:					[ ] Mediport  [ ] Urinary Catheter,  Date Placed:   [ ] Chest tube: [ ] Right, [ ] Left  [ ] TAMMY/Jeff Drains

## 2021-06-18 NOTE — PROGRESS NOTE ADULT - ASSESSMENT
ASSESSMENT:  50y M w/ PMHx of recurrent diverticulitis presents for acute sigmoid diverticulitis with intramural abscess.     PLAN:  - advance to low fiber diet  - D/c motrin for slightly elevated Cr  - ID consult for discharge antibiotic recommendations as patient failed outpatient therapy  - discharge planning  - Monitor vitals  - Monitor labs and replete as necessary  - Monitor for bowel function  - Continue Pain Medications if necessary  - Continue Antibiotics if necessary  - Encourage ambulation as tolerated  - DVT and GI Prophylaxis    Lines/Tubes: PIV

## 2021-06-19 ENCOUNTER — TRANSCRIPTION ENCOUNTER (OUTPATIENT)
Age: 51
End: 2021-06-19

## 2021-06-19 VITALS
SYSTOLIC BLOOD PRESSURE: 126 MMHG | HEART RATE: 62 BPM | DIASTOLIC BLOOD PRESSURE: 83 MMHG | TEMPERATURE: 97 F | RESPIRATION RATE: 20 BRPM

## 2021-06-19 PROCEDURE — 99239 HOSP IP/OBS DSCHRG MGMT >30: CPT

## 2021-06-19 RX ORDER — ERTAPENEM SODIUM 1 G/1
1 INJECTION, POWDER, LYOPHILIZED, FOR SOLUTION INTRAMUSCULAR; INTRAVENOUS
Qty: 14 | Refills: 0
Start: 2021-06-19 | End: 2021-07-02

## 2021-06-19 RX ORDER — ERTAPENEM SODIUM 1 G/1
1000 INJECTION, POWDER, LYOPHILIZED, FOR SOLUTION INTRAMUSCULAR; INTRAVENOUS EVERY 24 HOURS
Refills: 0 | Status: DISCONTINUED | OUTPATIENT
Start: 2021-06-20 | End: 2021-06-19

## 2021-06-19 RX ORDER — ERTAPENEM SODIUM 1 G/1
INJECTION, POWDER, LYOPHILIZED, FOR SOLUTION INTRAMUSCULAR; INTRAVENOUS
Refills: 0 | Status: DISCONTINUED | OUTPATIENT
Start: 2021-06-19 | End: 2021-06-19

## 2021-06-19 RX ORDER — ERTAPENEM SODIUM 1 G/1
1000 INJECTION, POWDER, LYOPHILIZED, FOR SOLUTION INTRAMUSCULAR; INTRAVENOUS ONCE
Refills: 0 | Status: COMPLETED | OUTPATIENT
Start: 2021-06-19 | End: 2021-06-19

## 2021-06-19 RX ADMIN — Medication 650 MILLIGRAM(S): at 13:00

## 2021-06-19 RX ADMIN — Medication 650 MILLIGRAM(S): at 05:23

## 2021-06-19 RX ADMIN — CEFEPIME 100 MILLIGRAM(S): 1 INJECTION, POWDER, FOR SOLUTION INTRAMUSCULAR; INTRAVENOUS at 13:01

## 2021-06-19 RX ADMIN — HEPARIN SODIUM 5000 UNIT(S): 5000 INJECTION INTRAVENOUS; SUBCUTANEOUS at 05:23

## 2021-06-19 RX ADMIN — Medication 100 MILLIGRAM(S): at 09:13

## 2021-06-19 RX ADMIN — PANTOPRAZOLE SODIUM 40 MILLIGRAM(S): 20 TABLET, DELAYED RELEASE ORAL at 06:34

## 2021-06-19 RX ADMIN — CEFEPIME 100 MILLIGRAM(S): 1 INJECTION, POWDER, FOR SOLUTION INTRAMUSCULAR; INTRAVENOUS at 08:32

## 2021-06-19 RX ADMIN — Medication 62.5 MILLIMOLE(S): at 03:37

## 2021-06-19 RX ADMIN — HEPARIN SODIUM 5000 UNIT(S): 5000 INJECTION INTRAVENOUS; SUBCUTANEOUS at 13:00

## 2021-06-19 RX ADMIN — ERTAPENEM SODIUM 120 MILLIGRAM(S): 1 INJECTION, POWDER, LYOPHILIZED, FOR SOLUTION INTRAMUSCULAR; INTRAVENOUS at 16:33

## 2021-06-19 NOTE — PROGRESS NOTE ADULT - ASSESSMENT
A/P:  LUIS EDUARDO LUIS is a 50yMale w/ diverticulitis. patient doing well. dispo planning    Plan:   f/u ID recs for DC  f/u diet tolerance  OOB  vitals  labs  dispo planning       A/P:  LUIS EDUARDO LUIS is a 50yMale w/ diverticulitis. patient doing well. having Gas, no BM. dispo planning    Plan:   f/u ID recs for DC  f/u diet tolerance  OOB  vitals  labs  dispo planning

## 2021-06-19 NOTE — DISCHARGE NOTE PROVIDER - NSDCFUADDAPPT_GEN_ALL_CORE_FT
Follow-up with Dr. Benjamin, call for appointment  Follow-up with Dr. Rgean, follow-up on 6/29, call for appointment  Okay to eat low fiber diet  Take IV abx once a day as per VNS  call the office if you have any questions

## 2021-06-19 NOTE — PROCEDURE NOTE - PROCEDURE DATE TIME, MLM
Left voice message with new dosing instructions and next INR date. Patient to return call and confirm understanding.  Lab appt made  
Pt returned call and confirmed receiving msg  
19-Jun-2021 14:55

## 2021-06-19 NOTE — DISCHARGE NOTE NURSING/CASE MANAGEMENT/SOCIAL WORK - NSDCFUADDAPPT_GEN_ALL_CORE_FT
Follow-up with Dr. Benjamin, call for appointment  Follow-up with Dr. Regan, follow-up on 6/29, call for appointment  Okay to eat low fiber diet  Take IV abx once a day as per VNS  call the office if you have any questions

## 2021-06-19 NOTE — DISCHARGE NOTE PROVIDER - CARE PROVIDER_API CALL
Alexx Benjamin)  ColonRectal Surgery  [address] [place] [address], [address] [place] [address]  Phone: [phone]  Fax: [phone]  Follow Up Time: 2 weeks    Savannah Regan)  Internal Medicine  [address] [place] [address]  Phone: [phone]  Fax: [phone]  Follow Up Time:

## 2021-06-19 NOTE — DISCHARGE NOTE NURSING/CASE MANAGEMENT/SOCIAL WORK - PATIENT PORTAL LINK FT
You can access the FollowMyHealth Patient Portal offered by [facility] by registering at the following website: [email or web address]. By joining [facility]’s FollowMyHealth portal, you will also be able to view your health information using other applications (apps) compatible with our system.

## 2021-06-19 NOTE — PROGRESS NOTE ADULT - SUBJECTIVE AND OBJECTIVE BOX
GENERAL SURGERY PROGRESS NOTE     LUIS EDUARDO LUIS  [address] [place] [address] day :3d  POD:  Procedure:   Surgical Attending: Alexx Benjamin  Overnight events: No acute events overnight. Patient found in NAD.     T(F): 96.4 (06-19-21 @ 00:30), Max: 98.1 (06-18-21 @ 13:00)  HR: 61 (06-19-21 @ 00:30) (51 - 65)  BP: 129/90 (06-19-21 @ 00:30) (99/60 - 129/90)  ABP: --  ABP(mean): --  RR: 18 (06-19-21 @ 00:30) (18 - 18)  SpO2: --      06-17-21 @ 07:01  -  06-18-21 @ 07:00  --------------------------------------------------------  IN:    dextrose 5% + sodium chloride 0.45%: 825 mL    Lactated Ringers: 687 mL  Total IN: 1512 mL    OUT:    Voided (mL): 2925 mL  Total OUT: 2925 mL    Total NET: -1413 mL      06-18-21 @ 07:01  -  06-19-21 @ 05:23  --------------------------------------------------------  IN:  Total IN: 0 mL    OUT:    Voided (mL): 1200 mL  Total OUT: 1200 mL    Total NET: -1200 mL         GI proph:  pantoprazole    Tablet 40 milliGRAM(s) Oral before breakfast    AC/ proph: heparin   Injectable 5000 Unit(s) SubCutaneous every 8 hours    ABx: cefepime   IVPB 2000 milliGRAM(s) IV Intermittent every 8 hours  metroNIDAZOLE  IVPB 500 milliGRAM(s) IV Intermittent every 8 hours      GEN: NAD, well appearing  HEENT: NC/AT  CHEST: Symmetric chest wall expansion. Regular heart rate  ABD: S/D, non-tender,    LABS  Labs:  CAPILLARY BLOOD GLUCOSE                              13.8   5.22  )-----------( 231      ( 18 Jun 2021 20:52 )             41.2       Auto Neutrophil %: 64.7 % (06-18-21 @ 20:52)  Auto Immature Granulocyte %: 0.4 % (06-18-21 @ 20:52)    06-18    137  |  102  |  9<L>  ----------------------------<  146<H>  4.0   |  24  |  1.2      Calcium, Total Serum: 8.8 mg/dL (06-18-21 @ 20:52)      LFTs:     Lactate, Blood: 1.1 mmol/L (06-16-21 @ 16:13)        Culture - Urine (collected 16 Jun 2021 18:02)  Source: .Urine Clean Catch (Midstream)  Final Report (17 Jun 2021 20:41):    No growth          RADIOLOGY & ADDITIONAL TESTS:

## 2021-06-19 NOTE — CONSULT NOTE ADULT - ASSESSMENT
ASSESSMENT  50M PMH/PSH only pertinent for diverticulitis, countless flares, never seen a surgeon, follows w/ Dr. Kim from GI, and PCP Otto, currently on day 10/14 outpt cipro/flagyl PO course for ongoing current flare. Last c-scope 2017, showed severe sigmoid diverticulosis, suboptimal prep, but no other gross abnormalities. Pt presents to ED today w/ 1 day of worsening of ongoing sx.     IMPRESSION  #Diverticulitis with Sepsis on admission P>90 WBC 16    CTAP Acute sigmoid diverticulitis with 2.8 cm intramural abscess. No pneumoperitoneum.    Failed po cipro/flagyl  #Obesity BMI (kg/m2): 32.2    Creatinine, Serum: 1.2 (06-18-21 @ 20:52)    Weight (kg): 98.9 (06-16-21 @ 15:20)    RECOMMENDATIONS  This is an incomplete consult note. All final recommendations to follow after interview and examination of the patient. Please follow recommendations noted below.  - ADD on ESR, CRP    If any questions, please call or send a message on [employer] Teams  Please continue to update ID with any pertinent new laboratory or radiographic findings  Spectra [phone]     ASSESSMENT  50M PMH/PSH only pertinent for diverticulitis, countless flares, never seen a surgeon, follows w/ Dr. Kim from GI, and PCP Otto, currently on day 10/14 outpt cipro/flagyl PO course for ongoing current flare. Last c-scope 2017, showed severe sigmoid diverticulosis, suboptimal prep, but no other gross abnormalities. Pt presents to ED today w/ 1 day of worsening of ongoing sx.     IMPRESSION  #Recurrent Diverticulitis with Sepsis on admission P>90 WBC 16    CTAP Acute sigmoid diverticulitis with 2.8 cm intramural abscess. No pneumoperitoneum.    Failed po cipro/flagyl (was only taking flagyl BID)  #Obesity BMI (kg/m2): 32.2    Creatinine, Serum: 1.2 (06-18-21 @ 20:52)    Weight (kg): 98.9 (06-16-21 @ 15:20)    RECOMMENDATIONS  - ADD on ESR, CRP  - midline x ertapenem 1g x at least 14 days (trend ESR, CRP)  - Need GI/ Surgery f/u  - Weekly CBC, CMP, ESR/CRP  - ID follow-up with Dr. Brando Regan Tuesday 6/29 for Telehealth. We will call the patient between 10:30-6:30      [address]       [phone]       Fax [phone]     If any questions, please call or send a message on [employer] Teams  Please continue to update ID with any pertinent new laboratory or radiographic findings  Spectra [phone]

## 2021-06-19 NOTE — DISCHARGE NOTE PROVIDER - NSDCCPCAREPLAN_GEN_ALL_CORE_FT
PRINCIPAL DISCHARGE DIAGNOSIS  Diagnosis: Diverticulitis  Assessment and Plan of Treatment:       SECONDARY DISCHARGE DIAGNOSES  Diagnosis: Abscess  Assessment and Plan of Treatment:

## 2021-06-19 NOTE — CONSULT NOTE ADULT - SUBJECTIVE AND OBJECTIVE BOX
LUIS EDUARDO LUIS  50y, Male  Allergy: No Known Allergies      CHIEF COMPLAINT:       LOS  3d    HPI  HPI:  GENERAL SURGERY CONSULT NOTE    Patient: LUIS EDUARDO LUIS , 50y ([birth year])Male   MRN: [id number]  Location: [facility] ED  Visit: 06-16-21 Emergency  Date: 06-16-21 @ 20:58    HPI:  50M PMH/PSH only pertinent for diverticulitis, countless flares, never seen a surgeon, follows w/ Dr. Kim from GI, and PCP Otto, currently on day 10/14 outpt cipro/flagyl PO course for ongoing current flare. Last c-scope 2017, showed severe sigmoid diverticulosis, suboptimal prep, but no other gross abnormalities. Pt presents to ED today w/ 1 day of worsening of ongoing sx. Pain sharp, located in the LLQ and suprapubic area. No exacerbating/relieving fx. Pt denies CP, SOB, n/v/, urinary symptoms.   (16 Jun 2021 20:58)      INFECTIOUS DISEASE HISTORY:  ID consulted for  diverticulitis    PMH  PAST MEDICAL & SURGICAL HISTORY:  History of diverticulitis        FAMILY HISTORY  non-contributory     SOCIAL HISTORY  Social History:  Pt denies any current heavy ETOH use, IVDU. No recent travel outside the [place].       ROS  ***    VITALS:  T(F): 97.4, Max: 98.1 (06-18-21 @ 13:00)  HR: 50  BP: 119/73  RR: 18Vital Signs Last 24 Hrs  T(C): 36.3 (19 Jun 2021 05:00), Max: 36.7 (18 Jun 2021 13:00)  T(F): 97.4 (19 Jun 2021 05:00), Max: 98.1 (18 Jun 2021 13:00)  HR: 50 (19 Jun 2021 05:00) (50 - 64)  BP: 119/73 (19 Jun 2021 05:00) (108/65 - 129/90)  BP(mean): --  RR: 18 (19 Jun 2021 05:00) (18 - 18)  SpO2: --    PHYSICAL EXAM:  ***    TESTS & MEASUREMENTS:                        13.8   5.22  )-----------( 231      ( 18 Jun 2021 20:52 )             41.2     06-18    137  |  102  |  9<L>  ----------------------------<  146<H>  4.0   |  24  |  1.2    Ca    8.8      18 Jun 2021 20:52  Phos  2.6     06-18  Mg     2.2     06-18      eGFR if Non African American: 70 mL/min/1.73M2 (06-18-21 @ 20:52)  eGFR if : 81 mL/min/1.73M2 (06-18-21 @ 20:52)          Culture - Urine (collected 06-16-21 @ 18:02)  Source: .Urine Clean Catch (Midstream)  Final Report (06-17-21 @ 20:41):    No growth        Lactate, Blood: 1.1 mmol/L (06-16-21 @ 16:13)      INFECTIOUS DISEASES TESTING  COVID-19 PCR: NotDetec (06-16-21 @ 21:40)      INFLAMMATORY MARKERS      RADIOLOGY & ADDITIONAL TESTS:  I have personally reviewed the last Chest xray  CXR      CT  CT Abdomen and Pelvis w/ IV Cont:   EXAM:  CT ABDOMEN AND PELVIS IC            PROCEDURE DATE:  06/16/2021            INTERPRETATION:  CLINICAL STATEMENT: Abdominal pain. History of diverticulitis.    TECHNIQUE: Contiguous axial CT images were obtained from the lower chest to the pubic symphysis following administration of 100cc Optiray 320 intravenous contrast.  Oral contrast was not administered.  Reformatted images in the coronal and sagittal planes were acquired.    COMPARISON CT: April 2, 2010.    OTHER STUDIES USED FOR CORRELATION: None.      FINDINGS:    LOWER CHEST: Unremarkable.    HEPATOBILIARY: No suspicious parenchymal lesion or biliary ductal dilatation.    SPLEEN: Unremarkable.    PANCREAS: Unremarkable.    ADRENAL GLANDS: Unremarkable.    KIDNEYS: Symmetric renal enhancement without hydronephrosis bilaterally. Bilateral cortical scarring.    ABDOMINOPELVIC NODES: Unremarkable.    PELVIC ORGANS: Unremarkable.    PERITONEUM/MESENTERY/BOWEL: Circumferential sigmoid colonic wall thickening with surrounding inflammatory changes consistent with diverticulitis. 2.8 x 2.1 cm intramural abscess (series 4 image 271) no drainable fluid collection or pneumoperitoneum. Normal caliber appendix. No bowel obstruction..    BONES/SOFT TISSUES: Degenerative changes of thespine..    IMPRESSION:    Acute sigmoid diverticulitis with 2.8 cm intramural abscess. No pneumoperitoneum.              ELLIOT LANDAU MD; Attending Radiologist  This document has been electronically signed. Jun 16 2021  6:57PM (06-16-21 @ 18:48)      CARDIOLOGY TESTING      MEDICATIONS  acetaminophen   Tablet .. 650 Oral every 6 hours  cefepime   IVPB 2000 IV Intermittent every 8 hours  heparin   Injectable 5000 SubCutaneous every 8 hours  metroNIDAZOLE  IVPB 500 IV Intermittent every 8 hours  pantoprazole    Tablet 40 Oral before breakfast        ANTIBIOTICS:  cefepime   IVPB 2000 milliGRAM(s) IV Intermittent every 8 hours  metroNIDAZOLE  IVPB 500 milliGRAM(s) IV Intermittent every 8 hours      ALLERGIES:  No Known Allergies       LUIS EDUARDO LUIS  50y, Male  Allergy: No Known Allergies      CHIEF COMPLAINT:       LOS  3d    HPI  HPI:  GENERAL SURGERY CONSULT NOTE    Patient: LUIS EDUARDO LUIS , 50y ([birth year])Male   MRN: [id number]  Location: [facility] ED  Visit: 06-16-21 Emergency  Date: 06-16-21 @ 20:58    HPI:  50M PMH/PSH only pertinent for diverticulitis, countless flares, never seen a surgeon, follows w/ Dr. Kim from GI, and PCP Otto, currently on day 10/14 outpt cipro/flagyl PO course for ongoing current flare. Last c-scope 2017, showed severe sigmoid diverticulosis, suboptimal prep, but no other gross abnormalities. Pt presents to ED today w/ 1 day of worsening of ongoing sx. Pain sharp, located in the LLQ and suprapubic area. No exacerbating/relieving fx. Pt denies CP, SOB, n/v/, urinary symptoms.   (16 Jun 2021 20:58)      INFECTIOUS DISEASE HISTORY:  ID consulted for recurrent diverticulitis  severe LLQ pain, denies fever, chills  started on cipro./flagyl was only taking flagyl BID given metallic taste and muscle pains    PMH  PAST MEDICAL & SURGICAL HISTORY:  History of diverticulitis        FAMILY HISTORY  non-contributory     SOCIAL HISTORY  Social History:  Pt denies any current heavy ETOH use, IVDU. No recent travel outside the [place].       ROS  General: Denies rigors, nightsweats  HEENT: Denies headache, rhinorrhea, sore throat, eye pain  CV: Denies CP, palpitations  PULM: Denies wheezing, hemoptysis  GI: Denies hematemesis, hematochezia, melena  : Denies discharge, hematuria  MSK: Denies arthralgias, myalgias  SKIN: Denies rash, lesions  NEURO: Denies paresthesias, weakness  PSYCH: Denies depression, anxiety     VITALS:  T(F): 97.4, Max: 98.1 (06-18-21 @ 13:00)  HR: 50  BP: 119/73  RR: 18Vital Signs Last 24 Hrs  T(C): 36.3 (19 Jun 2021 05:00), Max: 36.7 (18 Jun 2021 13:00)  T(F): 97.4 (19 Jun 2021 05:00), Max: 98.1 (18 Jun 2021 13:00)  HR: 50 (19 Jun 2021 05:00) (50 - 64)  BP: 119/73 (19 Jun 2021 05:00) (108/65 - 129/90)  BP(mean): --  RR: 18 (19 Jun 2021 05:00) (18 - 18)  SpO2: --    PHYSICAL EXAM:  Gen: NAD, resting in bed  HEENT: Normocephalic, atraumatic  Neck: supple, no lymphadenopathy  CV: Regular rate & regular rhythm  Lungs: decreased BS at bases, no fremitus  Abdomen: Soft, BS present, mild LLQ tenderness to palpation   Ext: Warm, well perfused  Neuro: non focal, awake  Skin: no rash, no erythema  Lines: no phlebitis     TESTS & MEASUREMENTS:                        13.8   5.22  )-----------( 231      ( 18 Jun 2021 20:52 )             41.2     06-18    137  |  102  |  9<L>  ----------------------------<  146<H>  4.0   |  24  |  1.2    Ca    8.8      18 Jun 2021 20:52  Phos  2.6     06-18  Mg     2.2     06-18      eGFR if Non African American: 70 mL/min/1.73M2 (06-18-21 @ 20:52)  eGFR if : 81 mL/min/1.73M2 (06-18-21 @ 20:52)          Culture - Urine (collected 06-16-21 @ 18:02)  Source: .Urine Clean Catch (Midstream)  Final Report (06-17-21 @ 20:41):    No growth        Lactate, Blood: 1.1 mmol/L (06-16-21 @ 16:13)      INFECTIOUS DISEASES TESTING  COVID-19 PCR: NotDetec (06-16-21 @ 21:40)      INFLAMMATORY MARKERS      RADIOLOGY & ADDITIONAL TESTS:  I have personally reviewed the last Chest xray  CXR      CT  CT Abdomen and Pelvis w/ IV Cont:   EXAM:  CT ABDOMEN AND PELVIS IC            PROCEDURE DATE:  06/16/2021            INTERPRETATION:  CLINICAL STATEMENT: Abdominal pain. History of diverticulitis.    TECHNIQUE: Contiguous axial CT images were obtained from the lower chest to the pubic symphysis following administration of 100cc Optiray 320 intravenous contrast.  Oral contrast was not administered.  Reformatted images in the coronal and sagittal planes were acquired.    COMPARISON CT: April 2, 2010.    OTHER STUDIES USED FOR CORRELATION: None.      FINDINGS:    LOWER CHEST: Unremarkable.    HEPATOBILIARY: No suspicious parenchymal lesion or biliary ductal dilatation.    SPLEEN: Unremarkable.    PANCREAS: Unremarkable.    ADRENAL GLANDS: Unremarkable.    KIDNEYS: Symmetric renal enhancement without hydronephrosis bilaterally. Bilateral cortical scarring.    ABDOMINOPELVIC NODES: Unremarkable.    PELVIC ORGANS: Unremarkable.    PERITONEUM/MESENTERY/BOWEL: Circumferential sigmoid colonic wall thickening with surrounding inflammatory changes consistent with diverticulitis. 2.8 x 2.1 cm intramural abscess (series 4 image 271) no drainable fluid collection or pneumoperitoneum. Normal caliber appendix. No bowel obstruction..    BONES/SOFT TISSUES: Degenerative changes of thespine..    IMPRESSION:    Acute sigmoid diverticulitis with 2.8 cm intramural abscess. No pneumoperitoneum.              ELLIOT LANDAU MD; Attending Radiologist  This document has been electronically signed. Jun 16 2021  6:57PM (06-16-21 @ 18:48)      CARDIOLOGY TESTING      MEDICATIONS  acetaminophen   Tablet .. 650 Oral every 6 hours  cefepime   IVPB 2000 IV Intermittent every 8 hours  heparin   Injectable 5000 SubCutaneous every 8 hours  metroNIDAZOLE  IVPB 500 IV Intermittent every 8 hours  pantoprazole    Tablet 40 Oral before breakfast        ANTIBIOTICS:  cefepime   IVPB 2000 milliGRAM(s) IV Intermittent every 8 hours  metroNIDAZOLE  IVPB 500 milliGRAM(s) IV Intermittent every 8 hours      ALLERGIES:  No Known Allergies

## 2021-06-19 NOTE — DISCHARGE NOTE NURSING/CASE MANAGEMENT/SOCIAL WORK - NSDCPEWEB_GEN_ALL_CORE
[facility] for Tobacco Control website --- [email or web address] website --- [email or web address]

## 2021-06-19 NOTE — DISCHARGE NOTE NURSING/CASE MANAGEMENT/SOCIAL WORK - NSDCPEEMAIL_GEN_ALL_CORE
[facility] for Tobacco Control email [email or web address]

## 2021-06-19 NOTE — DISCHARGE NOTE PROVIDER - HOSPITAL COURSE
50M PMH/PSH only pertinent for diverticulitis, countless flares, never seen a surgeon, follows w/ Dr. Kim from GI, and PCP Otto, currently on day 10/14 outpt cipro/flagyl PO course for ongoing current flare. Last c-scope 2017, showed severe sigmoid diverticulosis, suboptimal prep, but no other gross abnormalities. Pt presents to ED today w/ 1 day of worsening of ongoing sx. Pain sharp, located in the LLQ and suprapubic area. No exacerbating/relieving fx. Pt denies CP, SOB, n/v/, urinary symptoms. Patient was started on cefipime and flagyll. Over the course abdominal pain improved, diet advanced and passing gas and bowel movements. Pateint seen by ID who reccomended home IV ertapenem. Patient seen today, vitally stable and deemed ready for DC>

## 2021-06-19 NOTE — PROGRESS NOTE ADULT - ATTENDING COMMENTS
51yo male with PMHx of diverticulitis presenting with acute onset of bilateral lower abdominal pain, nausea/vomiting, and subjective fever/chills admitted 6/16 for acute diverticulitis. At this time, patient states that his pain has improved. Denies fever/chills, nausea/vomiting, chest pain or shortness of breath. On exam, abdomen is soft, less tender bilateral lower quadrants, non-peritoneal. Labs reviewed - WBC 7 from 16.8. Will start CLD, serial exams, pain control, continue antibiotics, colorectal evaluation for future surgical intervention, encourage ambulation/OOB, incentive spirometer, DVT ppx.
may go home today after consulting with ID for his outpatient abx plan, he needs to follow up with dr Benjamin, he has contact info for him

## 2021-06-20 LAB — CRP SERPL-MCNC: 44 MG/L — HIGH

## 2021-06-21 DIAGNOSIS — R10.13 EPIGASTRIC PAIN: ICD-10-CM

## 2021-06-28 DIAGNOSIS — R10.9 UNSPECIFIED ABDOMINAL PAIN: ICD-10-CM

## 2021-06-28 DIAGNOSIS — E66.9 OBESITY, UNSPECIFIED: ICD-10-CM

## 2021-06-28 DIAGNOSIS — K57.20 DIVERTICULITIS OF LARGE INTESTINE WITH PERFORATION AND ABSCESS WITHOUT BLEEDING: ICD-10-CM

## 2021-06-28 DIAGNOSIS — A41.9 SEPSIS, UNSPECIFIED ORGANISM: ICD-10-CM

## 2021-06-29 PROBLEM — Z87.19 PERSONAL HISTORY OF OTHER DISEASES OF THE DIGESTIVE SYSTEM: Chronic | Status: ACTIVE | Noted: 2021-06-16

## 2021-07-05 ENCOUNTER — OUTPATIENT (OUTPATIENT)
Dept: OUTPATIENT SERVICES | Facility: HOSPITAL | Age: 51
LOS: 1 days | Discharge: HOME | End: 2021-07-05
Payer: COMMERCIAL

## 2021-07-05 ENCOUNTER — RESULT REVIEW (OUTPATIENT)
Age: 51
End: 2021-07-05

## 2021-07-05 DIAGNOSIS — K57.33 DIVERTICULITIS OF LARGE INTESTINE WITHOUT PERFORATION OR ABSCESS WITH BLEEDING: ICD-10-CM

## 2021-07-05 PROCEDURE — 74177 CT ABD & PELVIS W/CONTRAST: CPT | Mod: 26

## 2021-07-08 ENCOUNTER — APPOINTMENT (OUTPATIENT)
Dept: SURGERY | Facility: CLINIC | Age: 51
End: 2021-07-08
Payer: COMMERCIAL

## 2021-07-08 VITALS
HEIGHT: 68 IN | OXYGEN SATURATION: 98 % | HEART RATE: 79 BPM | SYSTOLIC BLOOD PRESSURE: 122 MMHG | DIASTOLIC BLOOD PRESSURE: 70 MMHG | WEIGHT: 218 LBS | TEMPERATURE: 97.2 F | BODY MASS INDEX: 33.04 KG/M2

## 2021-07-08 PROCEDURE — 99213 OFFICE O/P EST LOW 20 MIN: CPT

## 2021-07-08 NOTE — ASSESSMENT
[FreeTextEntry1] : Mr. RANDALL has fairly extensive diverticular disease. His most recent recent attack he had perforated diverticulitis with an intramural abscess. Currently that is resolving based on his most recent CT scan of the abdomen and pelvis which I reviewed the films as well as to report. He feels well, and is tolerating a diet and moving his bowels without difficulty. There is no question that he needs surgery. He is interested in having surgery. The surgical procedure was described to him in detail. The risks benefits and alternatives were all discussed. He understands risks include the possibility for bleeding as well as infection. He understands the infection could lead to additional surgery as well as a temporary colostomy. All questions were answered. Informed consent was obtained. In addition he had education session with the nurse practitioner. Mr. Randall will be scheduled for surgery at his earliest convenience.

## 2021-07-08 NOTE — HISTORY OF PRESENT ILLNESS
[FreeTextEntry1] : This is a new patient visit for Mr. LUIS who has complained of diverticulitis. Mr. Summers he has multiple attacks of diverticulitis over the last 10 years. He states that he's been on oral antibiotics at least once or twice a year over that period of time. He's had no he's had several hospitalizations. His last hospitalization he was noted to have significant diverticulitis with intramural abscess. He presents today to followup and discuss scheduling surgery.

## 2021-07-08 NOTE — PHYSICAL EXAM
[Alert] : alert [Oriented to Person] : oriented to person [Oriented to Place] : oriented to place [Oriented to Time] : oriented to time [Calm] : calm [de-identified] : Soft, Non-tender, Non-distended, no guarding or rebound. [de-identified] : Healthy male, NAD [de-identified] : Full range of motion [de-identified] : No focal deficits.

## 2021-08-09 ENCOUNTER — NON-APPOINTMENT (OUTPATIENT)
Age: 51
End: 2021-08-09

## 2021-08-09 RX ORDER — METRONIDAZOLE 500 MG/1
500 TABLET ORAL
Qty: 42 | Refills: 2 | Status: DISCONTINUED | COMMUNITY
Start: 2021-06-08 | End: 2021-08-09

## 2021-08-09 RX ORDER — CIPROFLOXACIN HYDROCHLORIDE 500 MG/1
500 TABLET, FILM COATED ORAL
Qty: 28 | Refills: 2 | Status: DISCONTINUED | COMMUNITY
Start: 2021-06-08 | End: 2021-08-09

## 2021-08-09 RX ORDER — METRONIDAZOLE 500 MG/1
500 TABLET ORAL 3 TIMES DAILY
Qty: 42 | Refills: 0 | Status: DISCONTINUED | COMMUNITY
Start: 2020-10-01 | End: 2021-08-09

## 2021-08-09 RX ORDER — CIPROFLOXACIN HYDROCHLORIDE 500 MG/1
500 TABLET, FILM COATED ORAL
Qty: 28 | Refills: 0 | Status: DISCONTINUED | COMMUNITY
Start: 2020-10-01 | End: 2021-08-09

## 2021-08-09 RX ORDER — SIMETHICONE 250 MG/1
250 CAPSULE, GELATIN COATED ORAL
Qty: 30 | Refills: 6 | Status: DISCONTINUED | COMMUNITY
Start: 2021-06-21 | End: 2021-08-09

## 2021-08-10 ENCOUNTER — APPOINTMENT (OUTPATIENT)
Dept: GASTROENTEROLOGY | Facility: CLINIC | Age: 51
End: 2021-08-10
Payer: COMMERCIAL

## 2021-08-10 DIAGNOSIS — Z12.11 ENCOUNTER FOR SCREENING FOR MALIGNANT NEOPLASM OF COLON: ICD-10-CM

## 2021-08-10 DIAGNOSIS — Z87.891 PERSONAL HISTORY OF NICOTINE DEPENDENCE: ICD-10-CM

## 2021-08-10 PROCEDURE — 99213 OFFICE O/P EST LOW 20 MIN: CPT | Mod: 95

## 2021-08-10 NOTE — END OF VISIT
[Time Spent: ___ minutes] : I have spent [unfilled] minutes of time on the encounter. pictorial/verbal instruction/group instruction/individual instruction/video/computer/internet/written material/audio/skill demonstration

## 2021-08-10 NOTE — ASSESSMENT
[FreeTextEntry1] : 50 year old male patient average risk for CRC (aunt with CRC), hx of recurrent diverticulitis, NAFLD/ OROSCO, presents with new episode of diverticulitis, treated at [facility] with cipro and flagyl 10 days, currently resolved. Last colonoscopy 2017 with diverticulosis. \par Otherwise no GI complaints. \par Awaiting surgery by Dr Benjamin for recurrent sigmoid diverticulitis. \par Needs repeat colonoscopy at this point. \par \par \par Colonoscopy scheduled\par Risks and benefits discussed with patient.\par

## 2021-08-10 NOTE — PHYSICAL EXAM
[General Appearance - Alert] : alert [Hearing Threshold Finger Rub Not [place]] : hearing was normal [] : no respiratory distress [Skin Color & Pigmentation] : normal skin color and pigmentation [Oriented To Time, Place, And Person] : oriented to person, place, and time

## 2021-08-10 NOTE — HISTORY OF PRESENT ILLNESS
[Home] : at home, [unfilled] , at the time of the visit. [Medical Office: ([facility])___] : at the medical office located in  [Verbal consent obtained from patient] : the patient, [unfilled] [FreeTextEntry4] : Analisa Gar [de-identified] : 50 year old male patient average risk for CRC (aunt with CRC), hx of recurrent diverticulitis, NAFLD/ OROSCO, presents with new episode of diverticulitis, treated at [facility] with cipro and flagyl 10 days, currently resolved. Last colonoscopy 2017 with diverticulosis. \par Otherwise no GI complaints. \par Awaiting surgery by Dr Benjamin for recurrent sigmoid diverticulitis. \par Needs repeat colonoscopy at this point. \par

## 2021-08-24 ENCOUNTER — NON-APPOINTMENT (OUTPATIENT)
Age: 51
End: 2021-08-24

## 2021-08-26 ENCOUNTER — NON-APPOINTMENT (OUTPATIENT)
Age: 51
End: 2021-08-26

## 2021-08-27 ENCOUNTER — OUTPATIENT (OUTPATIENT)
Dept: OUTPATIENT SERVICES | Facility: HOSPITAL | Age: 51
LOS: 1 days | Discharge: HOME | End: 2021-08-27

## 2021-08-27 ENCOUNTER — LABORATORY RESULT (OUTPATIENT)
Age: 51
End: 2021-08-27

## 2021-08-27 DIAGNOSIS — Z11.59 ENCOUNTER FOR SCREENING FOR OTHER VIRAL DISEASES: ICD-10-CM

## 2021-08-30 ENCOUNTER — OUTPATIENT (OUTPATIENT)
Dept: OUTPATIENT SERVICES | Facility: HOSPITAL | Age: 51
LOS: 1 days | Discharge: HOME | End: 2021-08-30
Payer: COMMERCIAL

## 2021-08-30 ENCOUNTER — RESULT REVIEW (OUTPATIENT)
Age: 51
End: 2021-08-30

## 2021-08-30 ENCOUNTER — TRANSCRIPTION ENCOUNTER (OUTPATIENT)
Age: 51
End: 2021-08-30

## 2021-08-30 VITALS
HEIGHT: 69 IN | OXYGEN SATURATION: 98 % | SYSTOLIC BLOOD PRESSURE: 121 MMHG | HEART RATE: 77 BPM | WEIGHT: 229.94 LBS | RESPIRATION RATE: 18 BRPM | TEMPERATURE: 97 F | DIASTOLIC BLOOD PRESSURE: 69 MMHG

## 2021-08-30 VITALS — SYSTOLIC BLOOD PRESSURE: 128 MMHG | DIASTOLIC BLOOD PRESSURE: 90 MMHG | HEART RATE: 68 BPM | RESPIRATION RATE: 18 BRPM

## 2021-08-30 DIAGNOSIS — Z12.11 ENCOUNTER FOR SCREENING FOR MALIGNANT NEOPLASM OF COLON: Chronic | ICD-10-CM

## 2021-08-30 DIAGNOSIS — Z98.890 OTHER SPECIFIED POSTPROCEDURAL STATES: Chronic | ICD-10-CM

## 2021-08-30 PROCEDURE — 88305 TISSUE EXAM BY PATHOLOGIST: CPT | Mod: 26

## 2021-08-30 PROCEDURE — 45380 COLONOSCOPY AND BIOPSY: CPT

## 2021-08-30 RX ORDER — SIMETHICONE 80 MG/1
0 TABLET, CHEWABLE ORAL
Qty: 0 | Refills: 0 | DISCHARGE

## 2021-08-30 RX ORDER — PANTOPRAZOLE SODIUM 20 MG/1
0 TABLET, DELAYED RELEASE ORAL
Qty: 0 | Refills: 0 | DISCHARGE

## 2021-08-30 NOTE — ASU PATIENT PROFILE, ADULT - NSICDXPASTMEDICALHX_GEN_ALL_CORE_FT
PAST MEDICAL HISTORY:  Cigarette smoker former quit 6/2021    History of diverticulitis     ROJAS (obstructive sleep apnea)

## 2021-08-30 NOTE — ASU DISCHARGE PLAN (ADULT/PEDIATRIC) - CARE PROVIDER_API CALL
Iona Kim (MD)  Gastroenterology  [address] [place] [address]  Phone: [phone]  Fax: [phone]  Follow Up Time:

## 2021-08-30 NOTE — ASU PATIENT PROFILE, ADULT - NSICDXPASTSURGICALHX_GEN_ALL_CORE_FT
PAST SURGICAL HISTORY:  Encounter for screening colonoscopy last colonoscopy was 4.5 years ago    History of surgery T&A

## 2021-09-01 LAB — SURGICAL PATHOLOGY STUDY: SIGNIFICANT CHANGE UP

## 2021-09-02 ENCOUNTER — NON-APPOINTMENT (OUTPATIENT)
Age: 51
End: 2021-09-02

## 2021-09-03 DIAGNOSIS — K52.9 NONINFECTIVE GASTROENTERITIS AND COLITIS, UNSPECIFIED: ICD-10-CM

## 2021-09-03 DIAGNOSIS — K64.8 OTHER HEMORRHOIDS: ICD-10-CM

## 2021-09-03 DIAGNOSIS — K57.92 DIVERTICULITIS OF INTESTINE, PART UNSPECIFIED, WITHOUT PERFORATION OR ABSCESS WITHOUT BLEEDING: ICD-10-CM

## 2021-09-03 DIAGNOSIS — K57.30 DIVERTICULOSIS OF LARGE INTESTINE WITHOUT PERFORATION OR ABSCESS WITHOUT BLEEDING: ICD-10-CM

## 2021-10-04 PROBLEM — F17.210 NICOTINE DEPENDENCE, CIGARETTES, UNCOMPLICATED: Chronic | Status: ACTIVE | Noted: 2021-08-30

## 2021-10-04 PROBLEM — G47.33 OBSTRUCTIVE SLEEP APNEA (ADULT) (PEDIATRIC): Chronic | Status: ACTIVE | Noted: 2021-08-30

## 2021-10-05 ENCOUNTER — NON-APPOINTMENT (OUTPATIENT)
Age: 51
End: 2021-10-05

## 2021-11-09 ENCOUNTER — APPOINTMENT (OUTPATIENT)
Dept: SURGERY | Facility: CLINIC | Age: 51
End: 2021-11-09

## 2022-12-13 ENCOUNTER — APPOINTMENT (OUTPATIENT)
Dept: GASTROENTEROLOGY | Facility: CLINIC | Age: 52
End: 2022-12-13

## 2022-12-13 DIAGNOSIS — K57.32 DIVERTICULITIS OF LARGE INTESTINE W/OUT PERFORATION OR ABSCESS W/OUT BLEEDING: ICD-10-CM

## 2022-12-13 DIAGNOSIS — Z87.19 PERSONAL HISTORY OF OTHER DISEASES OF THE DIGESTIVE SYSTEM: ICD-10-CM

## 2022-12-13 DIAGNOSIS — R10.9 UNSPECIFIED ABDOMINAL PAIN: ICD-10-CM

## 2022-12-13 DIAGNOSIS — R14.0 ABDOMINAL DISTENSION (GASEOUS): ICD-10-CM

## 2022-12-13 DIAGNOSIS — Z87.898 PERSONAL HISTORY OF OTHER SPECIFIED CONDITIONS: ICD-10-CM

## 2022-12-13 DIAGNOSIS — K64.9 UNSPECIFIED HEMORRHOIDS: ICD-10-CM

## 2022-12-13 DIAGNOSIS — Z78.9 OTHER SPECIFIED HEALTH STATUS: ICD-10-CM

## 2022-12-13 PROCEDURE — 99214 OFFICE O/P EST MOD 30 MIN: CPT | Mod: 95

## 2022-12-13 RX ORDER — METRONIDAZOLE 500 MG/1
500 TABLET ORAL 3 TIMES DAILY
Qty: 42 | Refills: 1 | Status: ACTIVE | COMMUNITY
Start: 2022-12-13 | End: 1900-01-01

## 2022-12-13 RX ORDER — CIPROFLOXACIN HYDROCHLORIDE 500 MG/1
500 TABLET, FILM COATED ORAL
Qty: 28 | Refills: 1 | Status: ACTIVE | COMMUNITY
Start: 2022-12-13 | End: 1900-01-01

## 2022-12-13 RX ORDER — PSYLLIUM HUSK 0.4 G
CAPSULE ORAL
Refills: 0 | Status: ACTIVE | COMMUNITY

## 2022-12-13 RX ORDER — MULTIVITAMIN
TABLET ORAL
Refills: 0 | Status: ACTIVE | COMMUNITY

## 2022-12-13 RX ORDER — SIMETHICONE 125 MG
125 CAPSULE ORAL
Refills: 0 | Status: DISCONTINUED | COMMUNITY
End: 2022-12-13

## 2022-12-13 RX ORDER — PANTOPRAZOLE 40 MG/1
40 TABLET, DELAYED RELEASE ORAL
Qty: 30 | Refills: 3 | Status: DISCONTINUED | COMMUNITY
Start: 2021-06-21 | End: 2022-12-13

## 2022-12-13 RX ORDER — SODIUM PICOSULFATE, MAGNESIUM OXIDE, AND ANHYDROUS CITRIC ACID 10; 3.5; 12 MG/160ML; G/160ML; G/160ML
10-3.5-12 MG-GM LIQUID ORAL
Qty: 1 | Refills: 0 | Status: DISCONTINUED | COMMUNITY
Start: 2021-08-10 | End: 2022-12-13

## 2022-12-13 NOTE — ASSESSMENT
[FreeTextEntry1] : 50 year old male patient average risk for CRC (aunt with CRC), hx of recurrent diverticulitis, NAFLD/ OROSCO, presents with new episode of diverticulitis, treated at [facility] with cipro and flagyl 10 days, currently resolved. Last colonoscopy 2017 with diverticulosis. \par Otherwise no GI complaints. \par Awaiting surgery by Dr Benjamin for recurrent sigmoid diverticulitis. \par Repeat colonoscopy in 2021 showed SCAD, no polyps. \par Now taking aloe vera and doing exercises and yoga\par \par Continue same\par call back if any sx of diverticulitis\par \par

## 2022-12-13 NOTE — PHYSICAL EXAM
[Alert] : alert [Sclera] : the sclera and conjunctiva were normal [Hearing Threshold Finger Rub Not [place]] : hearing was normal [No Respiratory Distress] : no respiratory distress [Normal Color / Pigmentation] : normal skin color and pigmentation [Oriented To Time, Place, And Person] : oriented to person, place, and time

## 2022-12-13 NOTE — HISTORY OF PRESENT ILLNESS
[Home] : at home, [unfilled] , at the time of the visit. [Medical Office: ([facility])___] : at the medical office located in  [Verbal consent obtained from patient] : the patient, [unfilled] [FreeTextEntry4] : Analisa Gar  [de-identified] : 50 year old male patient average risk for CRC (aunt with CRC), hx of recurrent diverticulitis, NAFLD/ OROSCO, presents with new episode of diverticulitis, treated at [facility] with cipro and flagyl 10 days, currently resolved. Last colonoscopy 2017 with diverticulosis. \par Otherwise no GI complaints. \par Awaiting surgery by Dr Benjamin for recurrent sigmoid diverticulitis.\par Repeat colonoscopy in 2021 showed SCAD, no polyps. \par Now taking aloe vera and doing exercises and yoga

## 2023-04-06 ENCOUNTER — NON-APPOINTMENT (OUTPATIENT)
Age: 53
End: 2023-04-06

## 2023-04-11 ENCOUNTER — APPOINTMENT (OUTPATIENT)
Dept: ORTHOPEDIC SURGERY | Facility: CLINIC | Age: 53
End: 2023-04-11
Payer: COMMERCIAL

## 2023-04-11 VITALS — HEIGHT: 69 IN | WEIGHT: 240 LBS | BODY MASS INDEX: 35.55 KG/M2

## 2023-04-11 DIAGNOSIS — S89.91XA UNSPECIFIED INJURY OF RIGHT LOWER LEG, INITIAL ENCOUNTER: ICD-10-CM

## 2023-04-11 PROCEDURE — 73562 X-RAY EXAM OF KNEE 3: CPT | Mod: RT

## 2023-04-11 PROCEDURE — 99203 OFFICE O/P NEW LOW 30 MIN: CPT

## 2023-04-11 NOTE — PHYSICAL EXAM
[de-identified] : Physical exam of right knee:\par -No erythema, edema, ecchymosis present.  Skin intact\par -TTP over medial joint line, medial tibial plateau, medial femoral condyle\par -Calf is soft and nontender\par -Negative Ruben's, negative anterior drawer, patient able to straight leg raise\par -Varus and valgus stability\par -Full ROM with no pain\par -+2 posterior tibialis pulse\par -Sensation intact to light touch

## 2023-04-11 NOTE — DISCUSSION/SUMMARY
[de-identified] : Patient has an injury of the right knee.  Diagnosis and x-ray images were discussed with patient.  Today, patient was given a prescription for meloxicam 15 mg to take as needed for pain and inflammation.  Pt was educated about risks and benefits of anti-inflammatories.  Anti-inflammatories can irritate the stomach lining, so if there are any symptoms of acid reflux or heartburn the patient was instructed to stop taking the medication. To help prevent this, it is best to take with a meal. They cannot be taken if the pt is on blood thinners and if the patient is at risk of high blood pressure, blood pressure should be monitored daily while taking the medication.  Patient was encouraged to rest and modify activity as tolerated.  He can continue wearing a knee brace if desired.  He should continue icing the knee as well.  I advised that this pain to keep improving every day since he already feels 99% improved, within 2 weeks it should be under percent.  Patient will follow-up with me in 4 weeks, at this time if pain is not improved an MRI may be warranted.  Patient was also scheduled an appointment with Dr. Haynes to follow-up at his earliest convenience for his chronic ankle pain.  Patient is agreeable to the above plan and all questions were answered today.\par \par Supervising physician: Dr. Michaels

## 2023-04-11 NOTE — DATA REVIEWED
[FreeTextEntry1] : X-ray images were obtained the office today.  AP, lateral, oblique views of the right knee reveal no acute fracture, dislocations, bony abnormalities.

## 2023-04-11 NOTE — HISTORY OF PRESENT ILLNESS
[de-identified] : 52-year-old male presents with right knee injury.  Approximately 1 week ago, patient's ankle gave out and he fell landing on the side of his right knee.  Since then he has had pain while weightbearing.  However this pain has increased every day, today he states he feels 99% better.  While he had pain, he felt like his knee was going to pop out of the joint.  Patient has been resting, icing, and heating the knee for relief, as well as taking over-the-counter anti-inflammatories.  Patient denies any past injuries or surgeries to the knee.  Admits to chronic pain of the right ankle as well.

## 2023-04-18 ENCOUNTER — APPOINTMENT (OUTPATIENT)
Dept: ORTHOPEDIC SURGERY | Facility: CLINIC | Age: 53
End: 2023-04-18
Payer: COMMERCIAL

## 2023-04-18 DIAGNOSIS — S93.491A SPRAIN OF OTHER LIGAMENT OF RIGHT ANKLE, INITIAL ENCOUNTER: ICD-10-CM

## 2023-04-18 PROCEDURE — 73610 X-RAY EXAM OF ANKLE: CPT | Mod: RT

## 2023-04-18 PROCEDURE — 99213 OFFICE O/P EST LOW 20 MIN: CPT

## 2023-04-18 PROCEDURE — 99212 OFFICE O/P EST SF 10 MIN: CPT

## 2023-04-18 NOTE — DATA REVIEWED
[FreeTextEntry1] : 3 views of the patient's right ankle were reviewed by me personally.  No fracture or dislocation.

## 2023-04-18 NOTE — HISTORY OF PRESENT ILLNESS
[de-identified] : This is a pleasant 52-year-old patient who presents with a right ankle injury.  He sustained this injury several days ago.  Localized the pain to the lateral aspect of his right ankle.  He has had a prior history of multiple right ankle sprains however has never had physical therapy up to this point.  The pain in his knee is actually limiting him more at this point.

## 2023-04-18 NOTE — IMAGING
[de-identified] : He is alert oriented x3.  He is pleasant cooperative that exam.  I examined his right lower extremity.  He is tender to palpation over the lateral ankle.  Compared to the contralateral side I am not able to appreciate any significant instability.  No bony tenderness.  Full range of motion.  Neurovascular intact distally.

## 2023-04-18 NOTE — DISCUSSION/SUMMARY
[de-identified] : I discussed the patient's findings with him.  I think at this time he would benefit from a course of physical therapy.  Anti-inflammatory medication as needed.  All questions answered.

## 2023-05-09 ENCOUNTER — NON-APPOINTMENT (OUTPATIENT)
Age: 53
End: 2023-05-09

## 2023-05-11 ENCOUNTER — APPOINTMENT (OUTPATIENT)
Dept: ORTHOPEDIC SURGERY | Facility: CLINIC | Age: 53
End: 2023-05-11

## 2023-05-25 ENCOUNTER — APPOINTMENT (OUTPATIENT)
Dept: ORTHOPEDIC SURGERY | Facility: CLINIC | Age: 53
End: 2023-05-25

## 2023-06-04 ENCOUNTER — RX RENEWAL (OUTPATIENT)
Age: 53
End: 2023-06-04

## 2023-06-04 RX ORDER — MELOXICAM 15 MG/1
15 TABLET ORAL
Qty: 30 | Refills: 1 | Status: ACTIVE | COMMUNITY
Start: 2023-04-11 | End: 1900-01-01

## 2024-05-15 ENCOUNTER — NON-APPOINTMENT (OUTPATIENT)
Age: 54
End: 2024-05-15

## 2024-10-22 ENCOUNTER — APPOINTMENT (OUTPATIENT)
Dept: GASTROENTEROLOGY | Facility: CLINIC | Age: 54
End: 2024-10-22
Payer: MEDICAID

## 2024-10-22 VITALS — HEIGHT: 69 IN | BODY MASS INDEX: 36.29 KG/M2 | WEIGHT: 245 LBS

## 2024-10-22 DIAGNOSIS — K76.0 FATTY (CHANGE OF) LIVER, NOT ELSEWHERE CLASSIFIED: ICD-10-CM

## 2024-10-22 DIAGNOSIS — Z12.11 ENCOUNTER FOR SCREENING FOR MALIGNANT NEOPLASM OF COLON: ICD-10-CM

## 2024-10-22 DIAGNOSIS — K57.32 DIVERTICULITIS OF LARGE INTESTINE W/OUT PERFORATION OR ABSCESS W/OUT BLEEDING: ICD-10-CM

## 2024-10-22 PROCEDURE — 99214 OFFICE O/P EST MOD 30 MIN: CPT

## 2024-10-22 RX ORDER — MAGNESIUM OXIDE/MAG AA CHELATE 300 MG
CAPSULE ORAL
Refills: 0 | Status: ACTIVE | COMMUNITY

## 2024-10-22 RX ORDER — METRONIDAZOLE 500 MG/1
500 TABLET ORAL 3 TIMES DAILY
Qty: 42 | Refills: 0 | Status: ACTIVE | COMMUNITY
Start: 2024-10-22 | End: 1900-01-01

## 2024-10-22 RX ORDER — CIPROFLOXACIN HYDROCHLORIDE 500 MG/1
500 TABLET, FILM COATED ORAL
Qty: 28 | Refills: 0 | Status: ACTIVE | COMMUNITY
Start: 2024-10-22 | End: 1900-01-01

## 2025-06-23 ENCOUNTER — APPOINTMENT (OUTPATIENT)
Dept: SURGERY | Facility: CLINIC | Age: 55
End: 2025-06-23

## 2025-06-23 PROCEDURE — 99203 OFFICE O/P NEW LOW 30 MIN: CPT | Mod: 25

## 2025-06-23 PROCEDURE — 46600 DIAGNOSTIC ANOSCOPY SPX: CPT

## 2025-07-28 ENCOUNTER — NON-APPOINTMENT (OUTPATIENT)
Age: 55
End: 2025-07-28